# Patient Record
Sex: FEMALE | Race: WHITE | NOT HISPANIC OR LATINO | Employment: OTHER | URBAN - METROPOLITAN AREA
[De-identification: names, ages, dates, MRNs, and addresses within clinical notes are randomized per-mention and may not be internally consistent; named-entity substitution may affect disease eponyms.]

---

## 2023-02-28 LAB
APPEARANCE, URINE: ABNORMAL
BACTERIA, URINE: ABNORMAL /HPF
BILIRUBIN, URINE: NEGATIVE
BLOOD, URINE: NEGATIVE
COLOR, URINE: YELLOW
GLUCOSE, URINE: NEGATIVE MG/DL
KETONES, URINE: NEGATIVE MG/DL
LEUKOCYTE ESTERASE, URINE: ABNORMAL
MUCUS, URINE: ABNORMAL /LPF
NITRITE, URINE: NEGATIVE
PH, URINE: 6 (ref 5–8)
PROTEIN, URINE: NEGATIVE MG/DL
RBC, URINE: 3 /HPF (ref 0–5)
SPECIFIC GRAVITY, URINE: 1.02 (ref 1–1.03)
SQUAMOUS EPITHELIAL CELLS, URINE: 10 /HPF
TRANSITIONAL EPITHELIAL CELLS, URINE: 1 /HPF
UROBILINOGEN, URINE: 2 MG/DL (ref 0–1.9)
WBC CLUMPS, URINE: ABNORMAL /HPF
WBC, URINE: 43 /HPF (ref 0–5)

## 2023-04-25 ENCOUNTER — APPOINTMENT (OUTPATIENT)
Dept: LAB | Facility: LAB | Age: 78
End: 2023-04-25
Payer: MEDICARE

## 2023-04-28 LAB
AMORPHOUS CRYSTALS, URINE: ABNORMAL /HPF
APPEARANCE, URINE: CLEAR
BACTERIA, URINE: ABNORMAL /HPF
BILIRUBIN, URINE: NEGATIVE
BLOOD, URINE: NEGATIVE
COLOR, URINE: YELLOW
GLUCOSE, URINE: NEGATIVE MG/DL
KETONES, URINE: NEGATIVE MG/DL
LEUKOCYTE ESTERASE, URINE: ABNORMAL
NITRITE, URINE: NEGATIVE
PH, URINE: 5 (ref 5–8)
PROTEIN, URINE: NEGATIVE MG/DL
RBC, URINE: ABNORMAL /HPF (ref 0–5)
SPECIFIC GRAVITY, URINE: 1.01 (ref 1–1.03)
SQUAMOUS EPITHELIAL CELLS, URINE: 1 /HPF
UROBILINOGEN, URINE: <2 MG/DL (ref 0–1.9)
WBC, URINE: 6 /HPF (ref 0–5)

## 2023-04-30 LAB — URINE CULTURE: ABNORMAL

## 2023-05-16 LAB
APPEARANCE, URINE: ABNORMAL
BACTERIA, URINE: ABNORMAL /HPF
BILIRUBIN, URINE: NEGATIVE
BLOOD, URINE: NEGATIVE
COLOR, URINE: YELLOW
GLUCOSE, URINE: NEGATIVE MG/DL
KETONES, URINE: NEGATIVE MG/DL
LEUKOCYTE ESTERASE, URINE: ABNORMAL
NITRITE, URINE: NEGATIVE
PH, URINE: 5 (ref 5–8)
PROTEIN, URINE: NEGATIVE MG/DL
RBC, URINE: ABNORMAL /HPF (ref 0–5)
SPECIFIC GRAVITY, URINE: 1.02 (ref 1–1.03)
SQUAMOUS EPITHELIAL CELLS, URINE: 5 /HPF
UROBILINOGEN, URINE: <2 MG/DL (ref 0–1.9)
WBC CLUMPS, URINE: ABNORMAL /HPF
WBC, URINE: >182 /HPF (ref 0–5)

## 2023-05-19 LAB — URINE CULTURE: ABNORMAL

## 2023-05-26 LAB
AMPHETAMINE (PRESENCE) IN URINE BY SCREEN METHOD: NORMAL
BARBITURATES PRESENCE IN URINE BY SCREEN METHOD: NORMAL
BENZODIAZEPINE (PRESENCE) IN URINE BY SCREEN METHOD: NORMAL
CANNABINOIDS IN URINE BY SCREEN METHOD: NORMAL
COCAINE (PRESENCE) IN URINE BY SCREEN METHOD: NORMAL
DRUG SCREEN COMMENT URINE: NORMAL
FENTANYL URINE: NORMAL
METHADONE (PRESENCE) IN URINE BY SCREEN METHOD: NORMAL
OPIATES (PRESENCE) IN URINE BY SCREEN METHOD: NORMAL
OXYCODONE (PRESENCE) IN URINE BY SCREEN METHOD: NORMAL
PHENCYCLIDINE (PRESENCE) IN URINE BY SCREEN METHOD: NORMAL

## 2023-06-27 ENCOUNTER — HOSPITAL ENCOUNTER (OUTPATIENT)
Dept: DATA CONVERSION | Facility: HOSPITAL | Age: 78
End: 2023-06-27
Attending: ANESTHESIOLOGY | Admitting: ANESTHESIOLOGY
Payer: MEDICARE

## 2023-06-27 DIAGNOSIS — Z79.82 LONG TERM (CURRENT) USE OF ASPIRIN: ICD-10-CM

## 2023-06-27 DIAGNOSIS — Z79.85 LONG-TERM (CURRENT) USE OF INJECTABLE NON-INSULIN ANTIDIABETIC DRUGS: ICD-10-CM

## 2023-06-27 DIAGNOSIS — E11.9 TYPE 2 DIABETES MELLITUS WITHOUT COMPLICATIONS (MULTI): ICD-10-CM

## 2023-06-27 DIAGNOSIS — M25.511 PAIN IN RIGHT SHOULDER: ICD-10-CM

## 2023-06-27 DIAGNOSIS — I10 ESSENTIAL (PRIMARY) HYPERTENSION: ICD-10-CM

## 2023-06-27 DIAGNOSIS — E07.9 DISORDER OF THYROID, UNSPECIFIED: ICD-10-CM

## 2023-06-27 DIAGNOSIS — M19.011 PRIMARY OSTEOARTHRITIS, RIGHT SHOULDER: ICD-10-CM

## 2023-06-27 DIAGNOSIS — E78.00 PURE HYPERCHOLESTEROLEMIA, UNSPECIFIED: ICD-10-CM

## 2023-06-27 LAB — POCT GLUCOSE: 113 MG/DL (ref 74–99)

## 2023-08-11 LAB
APPEARANCE, URINE: ABNORMAL
BACTERIA, URINE: ABNORMAL /HPF
BILIRUBIN, URINE: NEGATIVE
BLOOD, URINE: NEGATIVE
COLOR, URINE: ABNORMAL
GLUCOSE, URINE: NEGATIVE MG/DL
KETONES, URINE: ABNORMAL MG/DL
LEUKOCYTE ESTERASE, URINE: ABNORMAL
MUCUS, URINE: ABNORMAL /LPF
NITRITE, URINE: POSITIVE
PH, URINE: 5 (ref 5–8)
PROTEIN, URINE: NEGATIVE MG/DL
RBC, URINE: 1 /HPF (ref 0–5)
SPECIFIC GRAVITY, URINE: 1.03 (ref 1–1.03)
SQUAMOUS EPITHELIAL CELLS, URINE: 2 /HPF
UROBILINOGEN, URINE: <2 MG/DL (ref 0–1.9)
WBC CLUMPS, URINE: ABNORMAL /HPF
WBC, URINE: 84 /HPF (ref 0–5)

## 2023-08-14 LAB — URINE CULTURE: ABNORMAL

## 2023-08-29 ENCOUNTER — HOSPITAL ENCOUNTER (OUTPATIENT)
Dept: DATA CONVERSION | Facility: HOSPITAL | Age: 78
End: 2023-08-29
Attending: ANESTHESIOLOGY | Admitting: ANESTHESIOLOGY
Payer: MEDICARE

## 2023-08-29 DIAGNOSIS — M19.011 PRIMARY OSTEOARTHRITIS, RIGHT SHOULDER: ICD-10-CM

## 2023-08-29 DIAGNOSIS — M25.511 PAIN IN RIGHT SHOULDER: ICD-10-CM

## 2023-09-29 VITALS — BODY MASS INDEX: 41.87 KG/M2 | WEIGHT: 227.51 LBS | HEIGHT: 62 IN

## 2023-10-01 NOTE — OP NOTE
Post Operative Note:     PreOp Diagnosis: Osteoarthritis right shoulder   Post-Procedure Diagnosis: Same   Procedure: 1.  Right intra-articular shoulder injection  with fluoroscopy  2.   3.   4.   5.   Surgeon: Dr. Cullen   Resident/Fellow/Other Assistant: None   Anesthesia: Local   Estimated Blood Loss (mL): none   Specimen: no   Findings: None     Operative Report Dictated:  Dictation: not applicable - note contains Operative  Report   Operative Report:    72-year-old  female here today for right intra-articular shoulder injection under fluoroscopy.  Risk and benefits of today's procedure as well as COVID-19  was discussed with her and a signed consent was obtained prior to her entering the operating room.  Surgical marker to reginald the surgical site preoperatively.  Preop diagnosis:  Severe degenerative joint disease right shoulder with arthralgia  Postop diagnosis: Same  Anesthesia: Local  Procedure: Right shoulder injection with fluoroscopic guidance   Patient is a 73year old  female who is here today for a  right shoulder injection. Patient has severe degenerative joint disease with chronic pain and has signed a consent prior to this procedure today. The patient was taken back to the operating  room and placed in the supine position.  A  sterile prep and draping of the shoulder was done with Betadine and chloraprep × 3.  Using fluoroscopic guidance and an 8 inch Sterile ring  forcep as a pointer, I identified the lateral humeral head and  glenohumeral joint and placed a total of 4 cc of 1% Xylocaine plain via 25-gauge 1-1/2 inch sterile needle for local anesthesia.  I then placed a sterile (1 1/2 inch) one and a half inch 22-gauge spinal needle at an oblique angle into the lateral aspect  of the humeral head.  Negative aspiration was noted of blood or fluid. 3 cc of Omnipaque 240 milligrams was injected to outline the glenohumeral joint and the humeral head.  This was followed by  injection of 40 mg Kenalog +4 cc of 2 % MPF xylocaine plain.  The needle was then removed and a sterile bandage applied over the puncture site along with Neosporin ointment. The patient was taken to recovery room in awake stable condition with no neurologic deficit.  The patient was scheduled for a follow-up visit  in 4-6 weeks.      Electronic Signatures:  Cody Cullen)  (Signed 29-Aug-2023 10:31)   Authored: Post Operative Note, Note Completion      Last Updated: 29-Aug-2023 10:31 by Cody Cullen ()

## 2023-10-02 NOTE — OP NOTE
Post Operative Note:     PreOp Diagnosis: Osteoarthritis right shoulder   Post-Procedure Diagnosis: Same   Procedure: 1.   2.  Right intra-articular shoulder injection with fluoroscopy  3.   4.   5.   Surgeon: Dr. Cullen   Resident/Fellow/Other Assistant: None   Anesthesia: Local   Estimated Blood Loss (mL): none   Specimen: no   Findings: None     Operative Report Dictated:  Dictation: not applicable - note contains Operative  Report   Operative Report:    78-year-old  female here today for right intra-articular shoulder injection on fluoroscopy.  Risk and benefits of today's procedure as well as COVID-19  was discussed with her and a signed consent was obtained prior to her entering the OR.  Preop diagnosis:  Severe degenerative joint disease right shoulder with arthralgia  Postop diagnosis: Same  Anesthesia: Local  Procedure: Right shoulder injection with fluoroscopic guidance   Patient is a 78year old  female who is here today for a right shoulder injection. Patient has severe degenerative joint disease with chronic pain and has signed a consent prior to this procedure today. The patient was taken back to the operating  room and placed in the supine position.  A  sterile prep and draping of the shoulder was done with Betadine and chloraprep × 3.  Using fluoroscopic guidance and an 8 inch Sterile ring  forcep as a pointer, I identified the lateral humeral head and  glenohumeral joint and placed a total of 4 cc of 1% Xylocaine plain via 25-gauge 1-1/2 inch sterile needle for local anesthesia.  I then placed a sterile (1 1/2 inch) one and a half inch 22-gauge spinal needle at an oblique angle into the lateral aspect  of the humeral head.  Negative aspiration was noted of blood or fluid. 3 cc of Omnipaque 240 milligrams was injected to outline the glenohumeral joint and the humeral head.  This was followed by injection of 40 mg Kenalog +4 cc of 2 % MPF xylocaine plain.  The needle was then  removed and a sterile bandage applied over the puncture site along with Neosporin ointment. The patient was taken to recovery room in awake stable condition with no neurologic deficit.  The patient was scheduled for a follow-up visit  in 4-6 weeks.      Electronic Signatures:  Cody Cullen)  (Signed 27-Jun-2023 10:35)   Authored: Post Operative Note, Note Completion      Last Updated: 27-Jun-2023 10:35 by Cody Cullen ()

## 2023-10-20 ENCOUNTER — TELEPHONE (OUTPATIENT)
Dept: PRIMARY CARE | Facility: CLINIC | Age: 78
End: 2023-10-20
Payer: MEDICARE

## 2023-10-20 PROBLEM — E03.9 ACQUIRED HYPOTHYROIDISM: Status: ACTIVE | Noted: 2023-10-20

## 2023-10-20 PROBLEM — M54.12 CHRONIC CERVICAL RADICULOPATHY: Status: ACTIVE | Noted: 2023-10-20

## 2023-10-20 PROBLEM — M54.2 CERVICALGIA: Status: ACTIVE | Noted: 2023-10-20

## 2023-10-20 PROBLEM — L40.50 PSORIATIC ARTHRITIS (MULTI): Status: ACTIVE | Noted: 2023-10-20

## 2023-10-20 PROBLEM — I10 ESSENTIAL HYPERTENSION: Status: ACTIVE | Noted: 2023-10-20

## 2023-10-20 PROBLEM — E55.9 VITAMIN D DEFICIENCY: Status: ACTIVE | Noted: 2023-10-20

## 2023-10-20 PROBLEM — E11.319: Status: ACTIVE | Noted: 2023-10-20

## 2023-10-20 PROBLEM — L40.9 PSORIASIS: Status: ACTIVE | Noted: 2023-10-20

## 2023-10-20 PROBLEM — G62.9 POLYNEUROPATHY: Status: ACTIVE | Noted: 2023-10-20

## 2023-10-20 PROBLEM — K21.9 GASTROESOPHAGEAL REFLUX DISEASE: Status: ACTIVE | Noted: 2023-10-20

## 2023-10-20 PROBLEM — D50.8 IRON DEFICIENCY ANEMIA SECONDARY TO INADEQUATE DIETARY IRON INTAKE: Status: ACTIVE | Noted: 2023-10-20

## 2023-10-20 PROBLEM — E78.1 HIGH TRIGLYCERIDES: Status: ACTIVE | Noted: 2023-10-20

## 2023-10-20 PROBLEM — E11.9 DIABETES (MULTI): Status: ACTIVE | Noted: 2023-10-20

## 2023-10-20 RX ORDER — HYDROCORTISONE 25 MG/G
CREAM TOPICAL
COMMUNITY
Start: 2022-06-28 | End: 2023-10-22 | Stop reason: WASHOUT

## 2023-10-20 RX ORDER — DIMETHICONE 13 MG/ML
LOTION TOPICAL
COMMUNITY

## 2023-10-20 RX ORDER — METAPROTERENOL SULFATE 10 MG
1 TABLET ORAL 2 TIMES DAILY
COMMUNITY
Start: 2023-07-15 | End: 2023-10-22 | Stop reason: WASHOUT

## 2023-10-20 RX ORDER — DOCUSATE SODIUM 100 MG/1
1 CAPSULE, LIQUID FILLED ORAL DAILY PRN
COMMUNITY

## 2023-10-20 RX ORDER — INDAPAMIDE 2.5 MG/1
1 TABLET ORAL EVERY MORNING
COMMUNITY
Start: 2002-05-17 | End: 2024-04-15

## 2023-10-20 RX ORDER — TIZANIDINE 2 MG/1
1 TABLET ORAL DAILY PRN
COMMUNITY
End: 2023-10-22 | Stop reason: WASHOUT

## 2023-10-20 RX ORDER — LANOLIN ALCOHOL/MO/W.PET/CERES
1 CREAM (GRAM) TOPICAL
COMMUNITY

## 2023-10-20 RX ORDER — DICLOFENAC SODIUM 10 MG/G
GEL TOPICAL
COMMUNITY
Start: 2023-07-09

## 2023-10-20 RX ORDER — TIZANIDINE 4 MG/1
1 TABLET ORAL DAILY PRN
COMMUNITY

## 2023-10-20 RX ORDER — DIPHENOXYLATE HYDROCHLORIDE AND ATROPINE SULFATE 2.5; .025 MG/1; MG/1
1 TABLET ORAL 4 TIMES DAILY PRN
COMMUNITY
Start: 2023-01-11

## 2023-10-20 RX ORDER — GABAPENTIN 300 MG/1
300 CAPSULE ORAL 3 TIMES DAILY
COMMUNITY
End: 2023-10-27 | Stop reason: SDUPTHER

## 2023-10-22 RX ORDER — LEVOTHYROXINE SODIUM 50 UG/1
50 TABLET ORAL
COMMUNITY
Start: 2023-10-14 | End: 2024-04-15

## 2023-10-22 RX ORDER — CLOTRIMAZOLE 1 %
CREAM (GRAM) TOPICAL
COMMUNITY
Start: 2023-08-30

## 2023-10-22 RX ORDER — OMEGA-3/DHA/EPA/FISH OIL 60 MG-90MG
500 CAPSULE ORAL 2 TIMES DAILY
COMMUNITY
Start: 2023-08-31 | End: 2024-01-22 | Stop reason: SDUPTHER

## 2023-10-22 RX ORDER — ESCITALOPRAM OXALATE 10 MG/1
10 TABLET ORAL DAILY
COMMUNITY
Start: 2023-10-14 | End: 2024-04-15

## 2023-10-22 RX ORDER — SIMVASTATIN 40 MG/1
40 TABLET, FILM COATED ORAL NIGHTLY
COMMUNITY
Start: 2023-10-14 | End: 2024-04-15

## 2023-10-23 ENCOUNTER — LAB (OUTPATIENT)
Dept: LAB | Facility: LAB | Age: 78
End: 2023-10-23
Payer: MEDICARE

## 2023-10-23 ENCOUNTER — OFFICE VISIT (OUTPATIENT)
Dept: PRIMARY CARE | Facility: CLINIC | Age: 78
End: 2023-10-23
Payer: MEDICARE

## 2023-10-23 VITALS
TEMPERATURE: 97.1 F | HEIGHT: 62 IN | RESPIRATION RATE: 16 BRPM | SYSTOLIC BLOOD PRESSURE: 128 MMHG | OXYGEN SATURATION: 96 % | DIASTOLIC BLOOD PRESSURE: 78 MMHG | HEART RATE: 74 BPM | WEIGHT: 221.78 LBS | BODY MASS INDEX: 40.81 KG/M2

## 2023-10-23 DIAGNOSIS — K21.9 GASTROESOPHAGEAL REFLUX DISEASE WITHOUT ESOPHAGITIS: ICD-10-CM

## 2023-10-23 DIAGNOSIS — E55.9 VITAMIN D DEFICIENCY: ICD-10-CM

## 2023-10-23 DIAGNOSIS — E53.8 VITAMIN B12 DEFICIENCY: ICD-10-CM

## 2023-10-23 DIAGNOSIS — E78.1 HIGH TRIGLYCERIDES: ICD-10-CM

## 2023-10-23 DIAGNOSIS — I10 ESSENTIAL HYPERTENSION: Primary | ICD-10-CM

## 2023-10-23 DIAGNOSIS — F33.0 MILD EPISODE OF RECURRENT MAJOR DEPRESSIVE DISORDER (CMS-HCC): ICD-10-CM

## 2023-10-23 DIAGNOSIS — E11.319 DIABETIC RETINOPATHY ASSOCIATED WITH CONTROLLED TYPE 2 DIABETES MELLITUS (MULTI): ICD-10-CM

## 2023-10-23 DIAGNOSIS — G62.9 POLYNEUROPATHY: ICD-10-CM

## 2023-10-23 DIAGNOSIS — E03.9 ACQUIRED HYPOTHYROIDISM: ICD-10-CM

## 2023-10-23 LAB
25(OH)D3 SERPL-MCNC: 40 NG/ML (ref 30–100)
ALBUMIN SERPL BCP-MCNC: 4 G/DL (ref 3.4–5)
ALP SERPL-CCNC: 51 U/L (ref 33–136)
ALT SERPL W P-5'-P-CCNC: 15 U/L (ref 7–45)
ANION GAP SERPL CALC-SCNC: 18 MMOL/L (ref 10–20)
AST SERPL W P-5'-P-CCNC: 17 U/L (ref 9–39)
BILIRUB SERPL-MCNC: 0.6 MG/DL (ref 0–1.2)
BUN SERPL-MCNC: 18 MG/DL (ref 6–23)
CALCIUM SERPL-MCNC: 9.1 MG/DL (ref 8.6–10.3)
CHLORIDE SERPL-SCNC: 99 MMOL/L (ref 98–107)
CHOLEST SERPL-MCNC: 151 MG/DL (ref 0–199)
CHOLESTEROL/HDL RATIO: 3.7
CO2 SERPL-SCNC: 30 MMOL/L (ref 21–32)
CREAT SERPL-MCNC: 0.9 MG/DL (ref 0.5–1.05)
ERYTHROCYTE [DISTWIDTH] IN BLOOD BY AUTOMATED COUNT: 12.7 % (ref 11.5–14.5)
EST. AVERAGE GLUCOSE BLD GHB EST-MCNC: 120 MG/DL
GFR SERPL CREATININE-BSD FRML MDRD: 65 ML/MIN/1.73M*2
GLUCOSE SERPL-MCNC: 112 MG/DL (ref 74–99)
HBA1C MFR BLD: 5.8 %
HCT VFR BLD AUTO: 43.5 % (ref 36–46)
HDLC SERPL-MCNC: 40.3 MG/DL
HGB BLD-MCNC: 14 G/DL (ref 12–16)
LDLC SERPL CALC-MCNC: 66 MG/DL
MCH RBC QN AUTO: 31.2 PG (ref 26–34)
MCHC RBC AUTO-ENTMCNC: 32.2 G/DL (ref 32–36)
MCV RBC AUTO: 97 FL (ref 80–100)
NON HDL CHOLESTEROL: 111 MG/DL (ref 0–149)
NRBC BLD-RTO: 0 /100 WBCS (ref 0–0)
PLATELET # BLD AUTO: 261 X10*3/UL (ref 150–450)
PMV BLD AUTO: 10 FL (ref 7.5–11.5)
POTASSIUM SERPL-SCNC: 3.8 MMOL/L (ref 3.5–5.3)
PROT SERPL-MCNC: 6.1 G/DL (ref 6.4–8.2)
RBC # BLD AUTO: 4.49 X10*6/UL (ref 4–5.2)
SODIUM SERPL-SCNC: 143 MMOL/L (ref 136–145)
TRIGL SERPL-MCNC: 225 MG/DL (ref 0–149)
TSH SERPL-ACNC: 2.16 MIU/L (ref 0.44–3.98)
VIT B12 SERPL-MCNC: 1493 PG/ML (ref 211–911)
VLDL: 45 MG/DL (ref 0–40)
WBC # BLD AUTO: 6.9 X10*3/UL (ref 4.4–11.3)

## 2023-10-23 PROCEDURE — 3078F DIAST BP <80 MM HG: CPT | Performed by: NURSE PRACTITIONER

## 2023-10-23 PROCEDURE — 82607 VITAMIN B-12: CPT

## 2023-10-23 PROCEDURE — 99349 HOME/RES VST EST MOD MDM 40: CPT | Performed by: NURSE PRACTITIONER

## 2023-10-23 PROCEDURE — 36415 COLL VENOUS BLD VENIPUNCTURE: CPT | Performed by: NURSE PRACTITIONER

## 2023-10-23 PROCEDURE — 1159F MED LIST DOCD IN RCRD: CPT | Performed by: NURSE PRACTITIONER

## 2023-10-23 PROCEDURE — 3074F SYST BP LT 130 MM HG: CPT | Performed by: NURSE PRACTITIONER

## 2023-10-23 PROCEDURE — 1160F RVW MEDS BY RX/DR IN RCRD: CPT | Performed by: NURSE PRACTITIONER

## 2023-10-23 PROCEDURE — 1126F AMNT PAIN NOTED NONE PRSNT: CPT | Performed by: NURSE PRACTITIONER

## 2023-10-23 PROCEDURE — 1036F TOBACCO NON-USER: CPT | Performed by: NURSE PRACTITIONER

## 2023-10-23 PROCEDURE — 36415 COLL VENOUS BLD VENIPUNCTURE: CPT

## 2023-10-23 PROCEDURE — 82306 VITAMIN D 25 HYDROXY: CPT

## 2023-10-23 PROCEDURE — 83036 HEMOGLOBIN GLYCOSYLATED A1C: CPT

## 2023-10-23 RX ORDER — TRIAMCINOLONE ACETONIDE 1 MG/G
1 OINTMENT TOPICAL 2 TIMES DAILY
COMMUNITY

## 2023-10-23 RX ORDER — BISMUTH SUBSALICYLATE 262 MG
1 TABLET,CHEWABLE ORAL DAILY
COMMUNITY

## 2023-10-23 RX ORDER — CLOBETASOL PROPIONATE 0.5 MG/G
1 OINTMENT TOPICAL 2 TIMES DAILY
COMMUNITY

## 2023-10-23 RX ORDER — PANTOPRAZOLE SODIUM 40 MG/1
40 TABLET, DELAYED RELEASE ORAL
COMMUNITY

## 2023-10-23 RX ORDER — BUTYROSPERMUM PARKII(SHEA BUTTER), SIMMONDSIA CHINENSIS (JOJOBA) SEED OIL, ALOE BARBADENSIS LEAF EXTRACT .01; 1; 3.5 G/100G; G/100G; G/100G
1 LIQUID TOPICAL DAILY
COMMUNITY

## 2023-10-23 RX ORDER — METFORMIN HYDROCHLORIDE 500 MG/1
1 TABLET ORAL EVERY OTHER DAY
COMMUNITY
End: 2024-04-22 | Stop reason: WASHOUT

## 2023-10-23 RX ORDER — ASPIRIN 81 MG/1
81 TABLET ORAL DAILY
COMMUNITY

## 2023-10-23 RX ORDER — METOPROLOL TARTRATE 25 MG/1
0.5 TABLET, FILM COATED ORAL 2 TIMES DAILY
COMMUNITY

## 2023-10-23 ASSESSMENT — ENCOUNTER SYMPTOMS
PALPITATIONS: 0
ARTHRALGIAS: 1
VOMITING: 0
CONSTIPATION: 1
NAUSEA: 0
COUGH: 1
TROUBLE SWALLOWING: 0
FEVER: 0
OCCASIONAL FEELINGS OF UNSTEADINESS: 1
LIGHT-HEADEDNESS: 0
WHEEZING: 0
CHILLS: 0
LOSS OF SENSATION IN FEET: 0
DIFFICULTY URINATING: 0
DEPRESSION: 1
APPETITE CHANGE: 0
UNEXPECTED WEIGHT CHANGE: 0
ABDOMINAL PAIN: 0
SHORTNESS OF BREATH: 1
DIZZINESS: 0

## 2023-10-23 ASSESSMENT — PAIN SCALES - GENERAL: PAINLEVEL: 0-NO PAIN

## 2023-10-23 NOTE — PROGRESS NOTES
Subjective   Patient ID: Sara Zavala is a 78 y.o. female who presents for Follow-up (Chronic medical conditions ).    Visit for 77 y/o female seen today in private home, alone for routine follow up of chronic conditions. Patient is sitting up in recliner this morning watching TV. She is alert, oriented, able to answer all questions regarding his health. Patient lives in a single story home with her brother. She has a vehicle and is able to drive very short distances but has not driven recently due to declining mobility. She is ambulatory short distances. She has a cane but recently started using a walker. Her brother provides most transportation and takes her to necessary medical appointments. She did go and get a COVID and Influenza vaccine on 10/3/23. She tolerated both vaccines well. Denies recent fall or injury. Patients brother Larry does all meal preparation in the home. He does all of the grocery shopping and picks up all medications from local [employer] [employer]. She is going to be switching to [facility] pharmacy in January. Patient denies appetite changes, abdominal pain, nausea, vomiting. She does have some occasional constipation but denies any issue today. Denies bladder concerns. Denies difficulty sleeping. No recent hospitalizations.     Chronic pain- mostly in lower back, right shoulder and bilateral knees. She is active with Dr. Minaya, pain management. She is scheduled for a follow up on 10/27. Patient takes Gabapentin routinely. She is taking Tylenol and Voltaren gel as needed with improvement.     HTN- patient has a home BP cuff and monitors her blood pressure routinely. Cardiologist is Dr. Young. She has follow up scheduled 11/2. Denies headaches,chest pain, palpitations.     DM- patient monitors glucose levels daily. She does admit that she will occasionally skip a day here and there. FBS typically range from . Patient denies dizziness, lightheadedness. No recent episodes of hypoglycemia. She  follows with podiatry Dr. Zee and has follow up 11/1/23. She follows with opththamologist Dr Watts annually. Will be due June 2024 for follow up. She reports that she recently started taking Metformin again, about 2 weeks ago. She is taking 1 tablet every other day. She has had a few loose Bms but believes it is diet related and not because of the Metformin.     Home Visit:          Medically necessary due to: Illness or condition that results in activity lmitation or restriction that impacts the ability to leave home such as:, unsteady gait/poor balance         Current Outpatient Medications:     aspirin 81 mg EC tablet, Take 1 tablet (81 mg) by mouth once daily., Disp: , Rfl:     calcium citrate/vitamin D3 (CALCIUM CITRATE + D ORAL), as directed Orally, Disp: , Rfl:     clobetasol (Temovate) 0.05 % ointment, Apply 1 Application topically 2 times a day., Disp: , Rfl:     clotrimazole (Lotrimin) 1 % cream, APPLY 1 APPLICATION EXTERNALLY TWICE A DAY, Disp: , Rfl:     cranberry extract 425 mg capsule, as directed Orally, Disp: , Rfl:     cyanocobalamin (Vitamin B-12) 1,000 mcg tablet, Take 1 tablet (1,000 mcg) by mouth., Disp: , Rfl:     diclofenac sodium (Voltaren) 1 % gel gel, APPLY SPARINGLY TO THE AFFECTED AREA 2 TO 3 TIMES A DAY AS NEEDED FOR PAIN., Disp: , Rfl:     diphenoxylate-atropine (Lomotil) 2.5-0.025 mg tablet, Take 1 tablet by mouth 4 times a day as needed., Disp: , Rfl:     docusate sodium (Colace) 100 mg capsule, Take 1 capsule (100 mg) by mouth once daily as needed., Disp: , Rfl:     dulaglutide (Trulicity) 0.75 mg/0.5 mL pen injector, INJECT 1 PEN-INJECTOR UNDER THE SKIN ONCE EVERY WEEK, Disp: 6 mL, Rfl: 1    escitalopram (Lexapro) 10 mg tablet, Take 1 tablet (10 mg) by mouth once daily., Disp: , Rfl:     Fish OiL 60- mg capsule, Take 1 capsule (500 mg) by mouth 2 times a day., Disp: , Rfl:     gabapentin (Neurontin) 300 mg capsule, Take 1 capsule (300 mg) by mouth 3 times a day., Disp: , Rfl:      indapamide (Lozol) 2.5 mg tablet, Take 1 tablet (2.5 mg) by mouth once daily in the morning., Disp: , Rfl:     levothyroxine (Synthroid, Levoxyl) 50 mcg tablet, Take 1 tablet (50 mcg) by mouth once daily in the morning. Take before meals. Take on an empty stomach., Disp: , Rfl:     metFORMIN (Glucophage) 500 mg tablet, Take 1 tablet (500 mg) by mouth every other day., Disp: , Rfl:     metoprolol tartrate (Lopressor) 25 mg tablet, Take 0.5 tablets (12.5 mg) by mouth 2 times a day., Disp: , Rfl:     multivitamin tablet, Take 1 tablet by mouth once daily., Disp: , Rfl:     pantoprazole (ProtoNix) 40 mg EC tablet, Take 1 tablet (40 mg) by mouth once daily in the morning. Take before meals. Do not crush, chew, or split., Disp: , Rfl:     saccharomyces boulardii (Florastor) 250 mg capsule, Take 1 capsule (250 mg) by mouth once daily., Disp: , Rfl:     simvastatin (Zocor) 40 mg tablet, Take 1 tablet (40 mg) by mouth once daily at bedtime., Disp: , Rfl:     tiZANidine (Zanaflex) 4 mg tablet, Take 1 tablet (4 mg) by mouth once daily as needed., Disp: , Rfl:     triamcinolone (Kenalog) 0.1 % ointment, Apply 1 Application topically 2 times a day., Disp: , Rfl:      Review of Systems   Constitutional:  Negative for appetite change, chills, fever and unexpected weight change.   HENT:  Negative for trouble swallowing.    Respiratory:  Positive for cough (occasional) and shortness of breath (on exertion). Negative for wheezing.    Cardiovascular:  Negative for chest pain, palpitations and leg swelling.   Gastrointestinal:  Positive for constipation (occasional, improved). Negative for abdominal pain, nausea and vomiting.   Endocrine:        Positive for diabetes, thyroid disease    Genitourinary:  Negative for difficulty urinating.   Musculoskeletal:  Positive for arthralgias (lower back, right shoulder, bilateral knees) and gait problem (uses walker).   Neurological:  Negative for dizziness and light-headedness.  "  Psychiatric/Behavioral:          Positive for depression     Objective   /78 (BP Location: Left arm, Patient Position: Sitting, BP Cuff Size: Adult)   Pulse 74   Temp 36.2 °C (97.1 °F) (Temporal)   Resp 16   Ht 1.575 m (5' 2\")   Wt 101 kg (221 lb 12.5 oz)   SpO2 96%   BMI 40.56 kg/m²     Physical Exam  Constitutional:       General: She is not in acute distress.     Appearance: Normal appearance. She is obese.      Comments: Sitting in recliner with legs dependent    HENT:      Head: Normocephalic and atraumatic.      Nose: Nose normal.      Mouth/Throat:      Mouth: Mucous membranes are moist.      Pharynx: Oropharynx is clear.   Eyes:      Extraocular Movements: Extraocular movements intact.      Pupils: Pupils are equal, round, and reactive to light.      Comments: Wearing glasses    Cardiovascular:      Rate and Rhythm: Normal rate and regular rhythm.      Heart sounds: Normal heart sounds. No murmur heard.     No friction rub. No gallop.   Pulmonary:      Effort: Pulmonary effort is normal. No respiratory distress.      Breath sounds: Normal breath sounds. No wheezing, rhonchi or rales.   Abdominal:      General: Bowel sounds are normal. There is no distension.      Palpations: Abdomen is soft.      Comments: Hernia present   Musculoskeletal:         General: Deformity (modest deformity of bilateral knees) present.      Cervical back: Neck supple.      Right lower leg: No edema.      Left lower leg: No edema.   Skin:     General: Skin is warm and dry.   Neurological:      General: No focal deficit present.      Mental Status: She is alert and oriented to person, place, and time.      Motor: Weakness (generalized) present.      Gait: Gait abnormal.   Psychiatric:         Mood and Affect: Mood normal.         Behavior: Behavior normal.       Assessment/Plan   Diagnoses and all orders for this visit:  Essential hypertension  Comments:  chronic, vitals stable, continue Aspirin, metoprolol. Follow up " with cardiologist as scheduled  Orders:  -     CBC  -     Comprehensive metabolic panel  High triglycerides  Comments:  chronic, continue with Fish oil, will check lipid panel today  Orders:  -     Lipid panel  Acquired hypothyroidism  Comments:  chronic, stable, continue Levothyroxine  Orders:  -     Tsh With Reflex To Free T4 If Abnormal  Diabetic retinopathy associated with controlled type 2 diabetes mellitus (CMS/[facility])  Comments:  chronic, continue trulicity, metformin every other day. Monitor BG level daily  Orders:  -     Hemoglobin A1c  Gastroesophageal reflux disease without esophagitis  Comments:  chronic, stable, continue Protonix  Polyneuropathy  Comments:  chronic, stable, continue Gabapentin. Continue Tylenol and Voltaren gel as needed  Vitamin B12 deficiency  Comments:  chronic, stable, continue b12 supplement  Orders:  -     Vitamin B12  Vitamin D deficiency  Comments:  chronic, stable, continue vitamin d supplement  Orders:  -     Vitamin D 25-Hydroxy,Total (for eval of Vitamin D levels)  Mild episode of recurrent major depressive disorder (CMS/[facility])  Comments:  chronic, mood stable, continue lexapro       Routine labs obtained in home- CBC, CMP, Lipid panel, A1c, TSH, Vit D, Vit B12 level- pt tolerated well     LUDIVINA Vail-CNP

## 2023-10-23 NOTE — LETTER
October 26, 2023     Sara Zavala  [address] [place] [id number]      Dear Ms. Zavala:    Below are the results from your recent visit:    Resulted Orders   CBC   Result Value Ref Range    WBC 6.9 4.4 - 11.3 x10*3/uL    nRBC 0.0 0.0 - 0.0 /100 WBCs    RBC 4.49 4.00 - 5.20 x10*6/uL    Hemoglobin 14.0 12.0 - 16.0 g/dL    Hematocrit 43.5 36.0 - 46.0 %    MCV 97 80 - 100 fL    MCH 31.2 26.0 - 34.0 pg    MCHC 32.2 32.0 - 36.0 g/dL    RDW 12.7 11.5 - 14.5 %    Platelets 261 150 - 450 x10*3/uL    MPV 10.0 7.5 - 11.5 fL   Comprehensive metabolic panel   Result Value Ref Range    Glucose 112 (H) 74 - 99 mg/dL    Sodium 143 136 - 145 mmol/L    Potassium 3.8 3.5 - 5.3 mmol/L    Chloride 99 98 - 107 mmol/L    Bicarbonate 30 21 - 32 mmol/L    Anion Gap 18 10 - 20 mmol/L    Urea Nitrogen 18 6 - 23 mg/dL    Creatinine 0.90 0.50 - 1.05 mg/dL    eGFR 65 >60 mL/min/1.73m*2      Comment:      Calculations of estimated GFR are performed using the 2021 CKD-EPI Study Refit equation without the race variable for the IDMS-Traceable creatinine methods.  https://jasn.asnjournals.org/content/early/2021/09/22/ASN.6377146225    Calcium 9.1 8.6 - 10.3 mg/dL    Albumin 4.0 3.4 - 5.0 g/dL    Alkaline Phosphatase 51 33 - 136 U/L    Total Protein 6.1 (L) 6.4 - 8.2 g/dL    AST 17 9 - 39 U/L    Bilirubin, Total 0.6 0.0 - 1.2 mg/dL    ALT 15 7 - 45 U/L      Comment:      Patients treated with Sulfasalazine may generate falsely decreased results for ALT.   Hemoglobin A1c   Result Value Ref Range    Hemoglobin A1C 5.8 (H) see below %    Estimated Average Glucose 120 Not Established mg/dL    Narrative    Diagnosis of Diabetes-Adults  Non-Diabetic: < or = 5.6%  Increased risk for developing diabetes: 5.7-6.4%  Diagnostic of diabetes: > or = 6.5%    Monitoring of Diabetes  Age (y)....................... Therapeutic Goal (%)  Adults: >18.........................<7.0  Pediatrics: 13-18...................<7.5  Pediatrics:  7-12....................<8.0  Pediatrics: 0-6..................... 7.5-8.5    American Diabetes Association. Diabetes Care 33(S1), Jan 2010       Lipid panel   Result Value Ref Range    Cholesterol 151 0 - 199 mg/dL      Comment:            Age      Desirable   Borderline High   High     0-19 Y     0 - 169       170 - 199     >/= 200    20-24 Y     0 - 189       190 - 224     >/= 225         >24 Y     0 - 199       200 - 239     >/= 240   **All ranges are based on fasting samples. Specific   therapeutic targets will vary based on patient-specific   cardiac risk.    Pediatric guidelines reference:Pediatrics 2011, 128(S5).Adult guidelines reference: NCEP ATPIII Guidelines,RIYA 2001, 258:2486-97    Venipuncture immediately after or during the administration of Metamizole may lead to falsely low results. Testing should be performed immediately prior to Metamizole dosing.    HDL-Cholesterol 40.3 mg/dL      Comment:        Age       Very Low   Low     Normal    High    0-19 Y    < 35      < 40     40-45     ----  20-24 Y    ----     < 40      >45      ----        >24 Y      ----     < 40     40-60      >60      Cholesterol/HDL Ratio 3.7       Comment:        Ref Values  Desirable  < 3.4  High Risk  > 5.0    LDL Calculated 66 <=99 mg/dL      Comment:                                  Near   Borderline      AGE      Desirable  Optimal    High     High     Very High     0-19 Y     0 - 109     ---    110-129   >/= 130     ----    20-24 Y     0 - 119     ---    120-159   >/= 160     ----      >24 Y     0 -  99   100-129  130-159   160-189     >/=190      VLDL 45 (H) 0 - 40 mg/dL    Triglycerides 225 (H) 0 - 149 mg/dL      Comment:         Age         Desirable   Borderline High   High     Very High   0 D-90 D    19 - 174         ----         ----        ----  91 D- 9 Y     0 -  74        75 -  99     >/= 100      ----    10-19 Y     0 -  89        90 - 129     >/= 130      ----    20-24 Y     0 - 114       115 - 149     >/= 150       ----         >24 Y     0 - 149       150 - 199    200- 499    >/= 500    Venipuncture immediately after or during the administration of Metamizole may lead to falsely low results. Testing should be performed immediately prior to Metamizole dosing.    Non HDL Cholesterol 111 0 - 149 mg/dL      Comment:            Age       Desirable   Borderline High   High     Very High     0-19 Y     0 - 119       120 - 144     >/= 145    >/= 160    20-24 Y     0 - 149       150 - 189     >/= 190      ----         >24 Y    30 mg/dL above LDL Cholesterol goal     Tsh With Reflex To Free T4 If Abnormal   Result Value Ref Range    Thyroid Stimulating Hormone 2.16 0.44 - 3.98 mIU/L    Narrative    TSH testing is performed using different testing methodology at [facility]3] than at other [facility]. Direct result comparisons should only be made within the same method.     Vitamin B12   Result Value Ref Range    Vitamin B12 1,493 (H) 211 - 911 pg/mL   Vitamin D 25-Hydroxy,Total (for eval of Vitamin D levels)   Result Value Ref Range    Vitamin D, 25-Hydroxy, Total 40 30 - 100 ng/mL    Narrative    Deficiency:         < 20   ng/ml  Insufficiency:      20-29  ng/ml  Sufficiency:         ng/ml  This assay accurately quantifies the sum of Vitamin D3, 25-Hydroxy and Vitamin D2,25-Hydroxy.     The test results show that your current treatment is working. Please {Therapies; lab letter directions:82794}. We recommend that you repeat the above test(s) in {Numbers; 1-10:28551} {Time; units w/plural:11}.    If you have any questions or concerns, please don't hesitate to call.         Sincerely,        Joi Breen, APRN-CNP

## 2023-10-27 ENCOUNTER — OFFICE VISIT (OUTPATIENT)
Dept: PAIN MEDICINE | Facility: HOSPITAL | Age: 78
End: 2023-10-27
Payer: MEDICARE

## 2023-10-27 VITALS
WEIGHT: 221.78 LBS | BODY MASS INDEX: 41.87 KG/M2 | TEMPERATURE: 98 F | DIASTOLIC BLOOD PRESSURE: 83 MMHG | SYSTOLIC BLOOD PRESSURE: 134 MMHG | HEART RATE: 69 BPM | HEIGHT: 61 IN

## 2023-10-27 DIAGNOSIS — G89.29 CHRONIC RIGHT SHOULDER PAIN: ICD-10-CM

## 2023-10-27 DIAGNOSIS — M25.511 CHRONIC RIGHT SHOULDER PAIN: ICD-10-CM

## 2023-10-27 DIAGNOSIS — Z79.899 MEDICATION MANAGEMENT: ICD-10-CM

## 2023-10-27 DIAGNOSIS — M54.2 CERVICALGIA: ICD-10-CM

## 2023-10-27 DIAGNOSIS — M54.12 CERVICAL RADICULITIS: ICD-10-CM

## 2023-10-27 DIAGNOSIS — M47.812 FACET ARTHROPATHY, CERVICAL: Primary | ICD-10-CM

## 2023-10-27 LAB
AMPHETAMINES UR QL SCN: NORMAL
BARBITURATES UR QL SCN: NORMAL
BENZODIAZ UR QL SCN: NORMAL
BZE UR QL SCN: NORMAL
CANNABINOIDS UR QL SCN: NORMAL
FENTANYL+NORFENTANYL UR QL SCN: NORMAL
OPIATES UR QL SCN: NORMAL
OXYCODONE+OXYMORPHONE UR QL SCN: NORMAL
PCP UR QL SCN: NORMAL

## 2023-10-27 PROCEDURE — 80307 DRUG TEST PRSMV CHEM ANLYZR: CPT | Performed by: NURSE PRACTITIONER

## 2023-10-27 PROCEDURE — 1126F AMNT PAIN NOTED NONE PRSNT: CPT | Performed by: NURSE PRACTITIONER

## 2023-10-27 PROCEDURE — 1159F MED LIST DOCD IN RCRD: CPT | Performed by: NURSE PRACTITIONER

## 2023-10-27 PROCEDURE — 3079F DIAST BP 80-89 MM HG: CPT | Performed by: NURSE PRACTITIONER

## 2023-10-27 PROCEDURE — 1160F RVW MEDS BY RX/DR IN RCRD: CPT | Performed by: NURSE PRACTITIONER

## 2023-10-27 PROCEDURE — 3075F SYST BP GE 130 - 139MM HG: CPT | Performed by: NURSE PRACTITIONER

## 2023-10-27 PROCEDURE — 1036F TOBACCO NON-USER: CPT | Performed by: NURSE PRACTITIONER

## 2023-10-27 PROCEDURE — 99213 OFFICE O/P EST LOW 20 MIN: CPT | Mod: ZK | Performed by: NURSE PRACTITIONER

## 2023-10-27 PROCEDURE — 99213 OFFICE O/P EST LOW 20 MIN: CPT | Performed by: NURSE PRACTITIONER

## 2023-10-27 RX ORDER — GABAPENTIN 300 MG/1
CAPSULE ORAL
Qty: 90 CAPSULE | Refills: 2 | Status: SHIPPED | OUTPATIENT
Start: 2023-10-27 | End: 2024-01-15 | Stop reason: SDUPTHER

## 2023-10-27 ASSESSMENT — ENCOUNTER SYMPTOMS
BACK PAIN: 1
CARDIOVASCULAR NEGATIVE: 1
MYALGIAS: 1
ENDOCRINE NEGATIVE: 1
CONSTITUTIONAL NEGATIVE: 1
NECK PAIN: 1
RESPIRATORY NEGATIVE: 1
ALLERGIC/IMMUNOLOGIC NEGATIVE: 1
ARTHRALGIAS: 1
NEUROLOGICAL NEGATIVE: 1
PSYCHIATRIC NEGATIVE: 1
GASTROINTESTINAL NEGATIVE: 1
NECK STIFFNESS: 0
EYES NEGATIVE: 1
JOINT SWELLING: 0

## 2023-10-27 NOTE — PROGRESS NOTES
I have personally reviewed the OAR[place] report for Sara Zavala   . I have considered the risks of abuse, dependence, addiction and diversion.   Is the patient prescribed a combination of a benzodiazepine and opioid? No  Patient tolerating without AE. Benefit outweighs the risk. Discussed risks/benefits with patient. All questions answered. States understanding.     Date of the last Controlled Substance Agreement: 10/27/2023    Last urine drug screening date/ordered today: 10/27/23  Results of last screen: Results as expected.     OPIOID   What is the patient’s goal of therapy? Pain control.  Is this being achieved with current treatment? Yes  Attestation statement: I feel that it is clinically indicated to continue this current medication regimen after consideration of alternative therapies, and other non-opioid treatments.     Opioid Risk Screening:   THE OPIOID RISK TOOL (ORT)                               Female      Male  Alcohol                            [1]            [3] = 0  Illegal Drugs                           [2]            [3]  = 0  1. Family History of Substance Abuse  Prescription Drugs                           [4]           [4]  = 0  Alcohol                           [3]           [3]   =0  Illicit Drugs                           [4]           [4]   =0  2. Personal History of Substance Abuse  Prescription Drugs                          [5]           [5]   =0  3. Age (If between 16 to 45)                          [1]           [1]   =0  4. History of Preadolescent Sexual Abuse                         [3]            [0]   =0  ADD, OCD, Bipolar, Schizophrenia                         [2]           [2]   =0  5. Psychological Disease  Depression                        [1]           [1]   =1    TOTAL Score =  1     Last opioid risk screening date/ordered today: 10/27/2023  Patient's total score is 1, within range of Low Risk (<= 3).     Reference :  Low Score = 0 to 3  Moderate Score = 4 to 7  High  Score = =8       Pain Scale Screening:   Pain Assessment and Documentation Tool (PADT)   Date of Assessment: 10/27/2023  Analgesia:   Patient reports her pain level on average during the past week is 5on a 0 - 10 scale.   Patient reports that her pain level at its worst during the past week was 8 on a 0 -10 scale.   50% of pain has been relieved during the past week per patient   Patient states that the amount of pain relief she is now obtaining from her current pain reliever(s) is enough to make a real difference in her life.   Query to clinician: Is the patient's pain relief clinically significant? yes  Activities of Daily Living:   Physical functioning: unchanged  Family relationships: unchanged  Social relationships: unchanged  Mood: unchanged  Sleep patterns: unchanged  Overall functioning: unchanged  Adverse Events: No, Sara Zavala is not experiencing side effects from current pain reliever.  Patients overall severity of side effect:none  Is your overall impression that this patient is benefiting (e.g., benefits, such as pain relief, outweigh side effects) from opioid therapy? no  Specific Analgesic Plan: Continue present regimen.

## 2023-10-27 NOTE — PATIENT INSTRUCTIONS
I refilled your gabapentin.  Continue taking 300 mg 3 times a day.  You may take extra pill as needed.    I will see you for follow-up visit in 3 months, 1/9/2024 at 9:20 AM in [place].

## 2023-10-27 NOTE — PROGRESS NOTES
Subjective   Patient ID: Sara Zavala is a 78 y.o. female who presents for Pain (Post procedure follow up).    Sara Guevara is a pleasant 78-year-old  female who is here for postprocedure follow-up.  Patient presents in a wheelchair.  She ambulates with the use of cane.  She is accompanied by her brother.    Patient had right shoulder injection done on 8/29/2023.  The injection has improved her shoulder and arm pain.  It provided 50% relief.  It lasted for almost a month.  The injection has improved her pain and her ability to participate in his daily activities.  The injection has improved her pain and her ability to participate in her daily activities.    Patient continues to have right-sided shoulder pain that goes down to her arm.  She has pain to her neck on occasion.  She rates her pain as 4 out of 10.  She describes as burning, sharp, tender and throbbing kind of pain.  She denies arm weakness, weakness in  or dropping objects.  She denies increasing leg weakness or change in balance.  She denies bowel or bladder incontinence.  She denies recent falls, injuries, or ER visits.  There has been no change since she was last seen.    Patient continues to take gabapentin.  She takes 300 mg 3 times a day.  She reports that she takes extra 1 pill as needed depending on her pain level.  She denies side effects from her medication.  I discussed the plan of care.  I will see her for follow-up visit.  Questions were answered during this encounter.    -----------  8/29/2023: Right shoulder injection  -------        Past Medical History  She has a past medical history of Anemia, iron deficiency, Anxiety disorder, Atherosclerotic heart disease of native coronary artery without angina pectoris, Chronic back pain, Diabetic neuropathy (CMS/HCC), Diabetic retinopathy (CMS/HCC), Enlarged uterus, Hyperlipidemia, Hypothyroidism, Left ventricular ejection fraction greater than 40% (07/22/2019), Low back pain,  Morbid obesity (CMS/HCC), Other specified diabetes mellitus without complications (CMS/HCC), Personal history of other diseases of the circulatory system, Psoriasis, and Vitamin D deficiency.    Surgical History  Past Surgical History:   Procedure Laterality Date    CARDIAC CATHETERIZATION  05/03/2017    COLONOSCOPY  08/04/2010    COLONOSCOPY  07/31/2015    EPIDURAL BLOCK INJECTION  09/21/2010    OTHER SURGICAL HISTORY      Carpal tunnel surgery    OTHER SURGICAL HISTORY      Phacoemulsification of cataract and insertion of intraocular lens    OTHER SURGICAL HISTORY  2004    Bariatric surgery - gastric bypass    OTHER SURGICAL HISTORY  1961    Incision and drainage of pilonidal cyst, also 1962    OTHER SURGICAL HISTORY  10/26/2019    Pacemaker insertion - carmen Florez    OTHER SURGICAL HISTORY  2005    Cholecystectomy laparoscopic    SINUS SURGERY  10/2013    sack of bacteria from left sinus (removed)    TOE SURGERY  1985    4 toe nails removed    UMBILICAL HERNIA REPAIR  1986        Social History  She reports that she has never smoked. She has never used smokeless tobacco. She reports that she does not currently use alcohol. She reports that she does not use drugs.    Family History  Family History   Problem Relation Name Age of Onset    Diabetes Mother      Heart disease Mother      Hypertension Mother      Heart disease Father      Stroke Father      Hypertension Sister      Mental illness Brother      Diabetes Brother      Hypertension Brother          Allergies  Ace inhibitors and Sulfamethoxazole-trimethoprim      Current Outpatient Medications:     aspirin 81 mg EC tablet, Take 1 tablet (81 mg) by mouth once daily., Disp: , Rfl:     calcium citrate/vitamin D3 (CALCIUM CITRATE + D ORAL), as directed Orally, Disp: , Rfl:     clobetasol (Temovate) 0.05 % ointment, Apply 1 Application topically 2 times a day., Disp: , Rfl:     clotrimazole (Lotrimin) 1 % cream, APPLY 1 APPLICATION EXTERNALLY TWICE A  DAY, Disp: , Rfl:     cranberry extract 425 mg capsule, as directed Orally, Disp: , Rfl:     cyanocobalamin (Vitamin B-12) 1,000 mcg tablet, Take 1 tablet (1,000 mcg) by mouth., Disp: , Rfl:     diclofenac sodium (Voltaren) 1 % gel gel, APPLY SPARINGLY TO THE AFFECTED AREA 2 TO 3 TIMES A DAY AS NEEDED FOR PAIN., Disp: , Rfl:     diphenoxylate-atropine (Lomotil) 2.5-0.025 mg tablet, Take 1 tablet by mouth 4 times a day as needed., Disp: , Rfl:     docusate sodium (Colace) 100 mg capsule, Take 1 capsule (100 mg) by mouth once daily as needed., Disp: , Rfl:     dulaglutide (Trulicity) 0.75 mg/0.5 mL pen injector, INJECT 1 PEN-INJECTOR UNDER THE SKIN ONCE EVERY WEEK, Disp: 6 mL, Rfl: 1    escitalopram (Lexapro) 10 mg tablet, Take 1 tablet (10 mg) by mouth once daily., Disp: , Rfl:     Fish OiL 60- mg capsule, Take 1 capsule (500 mg) by mouth 2 times a day., Disp: , Rfl:     gabapentin (Neurontin) 300 mg capsule, Take 300 mg 3 times a day.  May take extra 1 pill as needed., Disp: 90 capsule, Rfl: 2    indapamide (Lozol) 2.5 mg tablet, Take 1 tablet (2.5 mg) by mouth once daily in the morning., Disp: , Rfl:     levothyroxine (Synthroid, Levoxyl) 50 mcg tablet, Take 1 tablet (50 mcg) by mouth once daily in the morning. Take before meals. Take on an empty stomach., Disp: , Rfl:     metFORMIN (Glucophage) 500 mg tablet, Take 1 tablet (500 mg) by mouth every other day., Disp: , Rfl:     metoprolol tartrate (Lopressor) 25 mg tablet, Take 0.5 tablets (12.5 mg) by mouth 2 times a day., Disp: , Rfl:     multivitamin tablet, Take 1 tablet by mouth once daily., Disp: , Rfl:     pantoprazole (ProtoNix) 40 mg EC tablet, Take 1 tablet (40 mg) by mouth once daily in the morning. Take before meals. Do not crush, chew, or split., Disp: , Rfl:     saccharomyces boulardii (Florastor) 250 mg capsule, Take 1 capsule (250 mg) by mouth once daily., Disp: , Rfl:     simvastatin (Zocor) 40 mg tablet, Take 1 tablet (40 mg) by mouth once  daily at bedtime., Disp: , Rfl:     tiZANidine (Zanaflex) 4 mg tablet, Take 1 tablet (4 mg) by mouth once daily as needed., Disp: , Rfl:     triamcinolone (Kenalog) 0.1 % ointment, Apply 1 Application topically 2 times a day., Disp: , Rfl:      Review of Systems   Constitutional: Negative.    HENT: Negative.     Eyes: Negative.    Respiratory: Negative.     Cardiovascular: Negative.    Gastrointestinal: Negative.    Endocrine: Negative.    Genitourinary: Negative.    Musculoskeletal:  Positive for arthralgias, back pain, myalgias and neck pain. Negative for gait problem, joint swelling and neck stiffness.   Skin: Negative.    Allergic/Immunologic: Negative.    Neurological: Negative.    Psychiatric/Behavioral: Negative.          Physical Exam  Vitals and nursing note reviewed.   HENT:      Head: Normocephalic.      Nose: Nose normal.   Eyes:      Extraocular Movements: Extraocular movements intact.      Conjunctiva/sclera: Conjunctivae normal.      Pupils: Pupils are equal, round, and reactive to light.   Cardiovascular:      Rate and Rhythm: Normal rate and regular rhythm.   Pulmonary:      Effort: Pulmonary effort is normal.      Breath sounds: Normal breath sounds.   Musculoskeletal:         General: Tenderness present. No swelling, deformity or signs of injury.      Cervical back: No rigidity or tenderness.      Lumbar back: Spasms and tenderness present.      Right lower leg: No edema.      Left lower leg: No edema.      Comments: No neck rigidity.  Full range of motion but with discomfort with lateral flexion.  Positive for Spurling test bilaterally.  Positive for paraspinal tenderness at the cervical region bilaterally at C4-C5, C5-C6, C6-C7.  With nonspecific radicular symptoms.  Positive for minimal tenderness on palpation at the right shoulder joints.    Limited range of motion of the arms due to pain.  BUE 4/5.   Skin:     General: Skin is warm and dry.   Neurological:      General: No focal deficit  present.      Mental Status: She is alert and oriented to person, place, and time.   Psychiatric:         Mood and Affect: Mood normal.         Behavior: Behavior normal.          Last Recorded Vitals  /83   Pulse 69   Temp 36.7 °C (98 °F)   Wt 101 kg (221 lb 12.5 oz)     Relevant Results    Pain Management Panel  More data exists         Latest Ref Rng & Units 5/26/2023 10/28/2022   Pain Management Panel   Amphetamine Screen, Urine NEGATIVE PRESUMPTIVE NEGATIVE  PRESUMPTIVE NEGATIVE    Barbiturate Screen, Urine NEGATIVE PRESUMPTIVE NEGATIVE  PRESUMPTIVE NEGATIVE    Fentanyl Screen, Urine NEGATIVE PRESUMPTIVE NEGATIVE  PRESUMPTIVE NEGATIVE    Methadone Screen, Urine NEGATIVE PRESUMPTIVE NEGATIVE  PRESUMPTIVE NEGATIVE         Assessment/Plan   Problem List Items Addressed This Visit             ICD-10-CM    Cervicalgia M54.2    Relevant Medications    gabapentin (Neurontin) 300 mg capsule    Other Relevant Orders    PT eval and treat     Other Visit Diagnoses         Codes    Facet arthropathy, cervical    -  Primary M47.812    Relevant Medications    gabapentin (Neurontin) 300 mg capsule    Other Relevant Orders    PT eval and treat    Cervical radiculitis     M54.12    Relevant Medications    gabapentin (Neurontin) 300 mg capsule    Other Relevant Orders    PT eval and treat    Chronic right shoulder pain     M25.511, G89.29    Relevant Medications    gabapentin (Neurontin) 300 mg capsule    Other Relevant Orders    PT eval and treat    Medication management     Z79.899    Relevant Orders    Drug Screen, Urine With Reflex to Confirmation (Completed)                       1. Facet arthropathy, cervical  - gabapentin (Neurontin) 300 mg capsule; Take 300 mg 3 times a day.  May take extra 1 pill as needed.  Dispense: 90 capsule; Refill: 2  - PT eval and treat    2. Cervical radiculitis  - gabapentin (Neurontin) 300 mg capsule; Take 300 mg 3 times a day.  May take extra 1 pill as needed.  Dispense: 90 capsule;  Refill: 2  - PT eval and treat    3. Cervicalgia  - gabapentin (Neurontin) 300 mg capsule; Take 300 mg 3 times a day.  May take extra 1 pill as needed.  Dispense: 90 capsule; Refill: 2  - PT eval and treat    4. Chronic right shoulder pain  - gabapentin (Neurontin) 300 mg capsule; Take 300 mg 3 times a day.  May take extra 1 pill as needed.  Dispense: 90 capsule; Refill: 2  - PT eval and treat    5. Medication management  - Drug Screen, Urine With Reflex to Confirmation       Plan/Follow-up Instructions:     I refilled your gabapentin.  Continue taking 300 mg 3 times a day.  You may take extra pill as needed.    I will see you for follow-up visit in 3 months, 1/9/2024 at 9:20 AM in [place].    Disclaimer: This note was created using voice recognition software. It was not corrected for typographical or grammatical errors, inadvertent word insertion, or any unintended errors. Please feel free to contact me for clarification.        Marlen Waite, HAM, APRN, FNP-C   [facility]  Office #: [phone]  Fax # [phone]

## 2023-12-01 ENCOUNTER — PHARMACY VISIT (OUTPATIENT)
Dept: PHARMACY | Facility: CLINIC | Age: 78
End: 2023-12-01
Payer: COMMERCIAL

## 2023-12-01 DIAGNOSIS — E11.319 DIABETIC RETINOPATHY ASSOCIATED WITH CONTROLLED TYPE 2 DIABETES MELLITUS (MULTI): Primary | ICD-10-CM

## 2023-12-01 PROCEDURE — RXMED WILLOW AMBULATORY MEDICATION CHARGE

## 2023-12-01 RX ORDER — DULAGLUTIDE 0.75 MG/.5ML
0.75 INJECTION, SOLUTION SUBCUTANEOUS
Qty: 6 ML | Refills: 1 | Status: SHIPPED | OUTPATIENT
Start: 2023-12-01 | End: 2024-02-02 | Stop reason: SDUPTHER

## 2024-01-14 DIAGNOSIS — M54.12 CERVICAL RADICULITIS: ICD-10-CM

## 2024-01-14 DIAGNOSIS — G89.29 CHRONIC RIGHT SHOULDER PAIN: ICD-10-CM

## 2024-01-14 DIAGNOSIS — M47.812 FACET ARTHROPATHY, CERVICAL: ICD-10-CM

## 2024-01-14 DIAGNOSIS — M25.511 CHRONIC RIGHT SHOULDER PAIN: ICD-10-CM

## 2024-01-14 DIAGNOSIS — M54.2 CERVICALGIA: ICD-10-CM

## 2024-01-15 RX ORDER — GABAPENTIN 300 MG/1
CAPSULE ORAL
Qty: 90 CAPSULE | Refills: 0 | Status: SHIPPED | OUTPATIENT
Start: 2024-01-15 | End: 2024-03-08 | Stop reason: SDUPTHER

## 2024-01-19 ENCOUNTER — TELEPHONE (OUTPATIENT)
Dept: PRIMARY CARE | Facility: CLINIC | Age: 79
End: 2024-01-19
Payer: MEDICARE

## 2024-01-19 ENCOUNTER — APPOINTMENT (OUTPATIENT)
Dept: PAIN MEDICINE | Facility: HOSPITAL | Age: 79
End: 2024-01-19
Payer: MEDICARE

## 2024-01-22 ENCOUNTER — OFFICE VISIT (OUTPATIENT)
Dept: PRIMARY CARE | Facility: CLINIC | Age: 79
End: 2024-01-22
Payer: MEDICARE

## 2024-01-22 VITALS
OXYGEN SATURATION: 95 % | TEMPERATURE: 97.1 F | DIASTOLIC BLOOD PRESSURE: 64 MMHG | RESPIRATION RATE: 18 BRPM | HEIGHT: 61 IN | HEART RATE: 74 BPM | SYSTOLIC BLOOD PRESSURE: 116 MMHG | BODY MASS INDEX: 41.91 KG/M2

## 2024-01-22 DIAGNOSIS — K21.9 GASTROESOPHAGEAL REFLUX DISEASE WITHOUT ESOPHAGITIS: ICD-10-CM

## 2024-01-22 DIAGNOSIS — E11.319 DIABETIC RETINOPATHY ASSOCIATED WITH CONTROLLED TYPE 2 DIABETES MELLITUS (MULTI): Primary | ICD-10-CM

## 2024-01-22 DIAGNOSIS — E03.9 ACQUIRED HYPOTHYROIDISM: ICD-10-CM

## 2024-01-22 DIAGNOSIS — I10 ESSENTIAL HYPERTENSION: ICD-10-CM

## 2024-01-22 DIAGNOSIS — G62.9 POLYNEUROPATHY: ICD-10-CM

## 2024-01-22 DIAGNOSIS — E55.9 VITAMIN D DEFICIENCY: ICD-10-CM

## 2024-01-22 DIAGNOSIS — E78.1 HIGH TRIGLYCERIDES: ICD-10-CM

## 2024-01-22 PROCEDURE — 1125F AMNT PAIN NOTED PAIN PRSNT: CPT | Performed by: NURSE PRACTITIONER

## 2024-01-22 PROCEDURE — 99349 HOME/RES VST EST MOD MDM 40: CPT | Performed by: NURSE PRACTITIONER

## 2024-01-22 PROCEDURE — 3074F SYST BP LT 130 MM HG: CPT | Performed by: NURSE PRACTITIONER

## 2024-01-22 PROCEDURE — 1036F TOBACCO NON-USER: CPT | Performed by: NURSE PRACTITIONER

## 2024-01-22 PROCEDURE — 1159F MED LIST DOCD IN RCRD: CPT | Performed by: NURSE PRACTITIONER

## 2024-01-22 PROCEDURE — 1160F RVW MEDS BY RX/DR IN RCRD: CPT | Performed by: NURSE PRACTITIONER

## 2024-01-22 PROCEDURE — 3078F DIAST BP <80 MM HG: CPT | Performed by: NURSE PRACTITIONER

## 2024-01-22 RX ORDER — FUROSEMIDE 20 MG/1
20 TABLET ORAL DAILY
COMMUNITY
Start: 2024-01-19 | End: 2024-03-08 | Stop reason: ALTCHOICE

## 2024-01-22 RX ORDER — ASPIRIN 325 MG
500 TABLET, DELAYED RELEASE (ENTERIC COATED) ORAL 2 TIMES DAILY
Qty: 90 CAPSULE | Refills: 3 | Status: SHIPPED | OUTPATIENT
Start: 2024-01-22

## 2024-01-22 ASSESSMENT — PAIN SCALES - GENERAL: PAINLEVEL: 4

## 2024-01-22 NOTE — PROGRESS NOTES
Subjective   Patient ID: Sara Zavala is a 78 y.o. female who presents for Follow-up (DM, HTN, Edema).    Visit for 79 y/o female seen today in private home, alone for routine follow up of chronic medical conditions. She is sitting on recliner with legs dependent, wearing mask this morning. She is alert, oriented, answering all questions without difficulty. She lives in a single story home with her brother. She has a vehicle and is able to drive very short distances but she does not drive in inclement weather and reports that her brother drives most of the time due to her declining health. She is ambulatory short distances. She uses a cane in the house but mostly uses the walker if she goes out. She denies recent fall or injury. Patient is able to manage her own medications. She gets all of her medications from the local [facility] but has been getting her Trulicity from [facility] due to better pricing. She reports that her prescription coverage changed this month and she has [facility] now but she is hoping to stay with [facility] as it is easier for her. She reports having a dental appt scheduled 2/28/24.     Patient reports that she typically eats 2 meals a day and will have snacks. Her brother does all of the meal preparation in the home. She did bake a cake the other day. She denies appetite changes, signs of weight loss. She does not have a scale in the home to monitor her weight. Denies abdominal pain, nausea, vomiting. She reports that her constipation has been under control and she has not needed to take any medication to help have a BM. She is taking a probiotic which has helped. She denies any bladder issues.     Chronic pain- remains active with Dr. Minaya, pain management. She will see Marlen Waite most of the time. Reports that her pain is mostly in lower back, right shoulder and bilateral knees. She takes Gabapentin and reports it to be helpful. She is also using Voltaren gel, mostly on the  right shoulder. Next follow up is 3/8/24.     HTN- remains active with cardiologist Dr. Young. She had a follow up in December and saw the STEPHANIE. She reports that she had some mild swelling in her leg and was given a prescription for Furosemide but only took 1 dose and has been fine since. She has the medication on her bedside table but has not used it. She denies any headaches, chest pain, palpitations, increased shortness of breath, edema or signs of weight gain. Patient reports that she has a home BP cuff but has not checked her BP  recently as she is uncertain if the cuff reads correctly or not.     DM- patient monitors glucose levels daily. She is taking Metformin every other day and Trulicity weekly. Last A1c was 5.8. She denies any hypoglycemic events. Her podiatrist is Dr. Zee. She follows with opththamologist Dr Watts annually.     Home Visit:          Medically necessary due to: Illness or condition that results in activity lmitation or restriction that impacts the ability to leave home such as:, unsteady gait/poor balance         Current Outpatient Medications:     aspirin 81 mg EC tablet, Take 1 tablet (81 mg) by mouth once daily., Disp: , Rfl:     calcium citrate/vitamin D3 (CALCIUM CITRATE + D ORAL), as directed Orally, Disp: , Rfl:     clobetasol (Temovate) 0.05 % ointment, Apply 1 Application topically 2 times a day., Disp: , Rfl:     clotrimazole (Lotrimin) 1 % cream, APPLY 1 APPLICATION EXTERNALLY TWICE A DAY, Disp: , Rfl:     cranberry extract 425 mg capsule, as directed Orally, Disp: , Rfl:     cyanocobalamin (Vitamin B-12) 1,000 mcg tablet, Take 1 tablet (1,000 mcg) by mouth., Disp: , Rfl:     diclofenac sodium (Voltaren) 1 % gel gel, APPLY SPARINGLY TO THE AFFECTED AREA 2 TO 3 TIMES A DAY AS NEEDED FOR PAIN., Disp: , Rfl:     diphenoxylate-atropine (Lomotil) 2.5-0.025 mg tablet, Take 1 tablet by mouth 4 times a day as needed., Disp: , Rfl:     docusate sodium (Colace) 100 mg capsule, Take 1  capsule (100 mg) by mouth once daily as needed., Disp: , Rfl:     dulaglutide (Trulicity) 0.75 mg/0.5 mL pen injector, INJECT 1 PEN-INJECTOR UNDER THE SKIN ONCE EVERY WEEK, Disp: 6 mL, Rfl: 1    escitalopram (Lexapro) 10 mg tablet, Take 1 tablet (10 mg) by mouth once daily., Disp: , Rfl:     furosemide (Lasix) 20 mg tablet, Take 1 tablet (20 mg) by mouth once daily., Disp: , Rfl:     gabapentin (Neurontin) 300 mg capsule, TAKE ONE CAPSULE BY MOUTH THREE TIMES A DAY. MAY TAKE 1 ADDTIONAL CAPSULE BY MOUTH AS NEEDED., Disp: 90 capsule, Rfl: 0    indapamide (Lozol) 2.5 mg tablet, Take 1 tablet (2.5 mg) by mouth once daily in the morning., Disp: , Rfl:     levothyroxine (Synthroid, Levoxyl) 50 mcg tablet, Take 1 tablet (50 mcg) by mouth once daily in the morning. Take before meals. Take on an empty stomach., Disp: , Rfl:     metFORMIN (Glucophage) 500 mg tablet, Take 1 tablet (500 mg) by mouth every other day., Disp: , Rfl:     metoprolol tartrate (Lopressor) 25 mg tablet, Take 0.5 tablets (12.5 mg) by mouth 2 times a day., Disp: , Rfl:     multivitamin tablet, Take 1 tablet by mouth once daily., Disp: , Rfl:     omega-3 (Fish OiL) 60- mg capsule, Take 1 capsule (500 mg) by mouth 2 times a day., Disp: 90 capsule, Rfl: 3    pantoprazole (ProtoNix) 40 mg EC tablet, Take 1 tablet (40 mg) by mouth once daily in the morning. Take before meals. Do not crush, chew, or split., Disp: , Rfl:     saccharomyces boulardii (Florastor) 250 mg capsule, Take 1 capsule (250 mg) by mouth once daily., Disp: , Rfl:     simvastatin (Zocor) 40 mg tablet, Take 1 tablet (40 mg) by mouth once daily at bedtime., Disp: , Rfl:     tiZANidine (Zanaflex) 4 mg tablet, Take 1 tablet (4 mg) by mouth once daily as needed., Disp: , Rfl:     triamcinolone (Kenalog) 0.1 % ointment, Apply 1 Application topically 2 times a day., Disp: , Rfl:      Review of Systems  Constitutional:  Negative for appetite change, chills, fever and unexpected weight change.  "  HENT:  Negative for trouble swallowing.    Respiratory:  Positive for shortness of breath on exertion. Negative for wheezing.    Cardiovascular:  Negative for chest pain, palpitations and leg swelling.   Gastrointestinal:  Positive for occasional constipation. Negative for abdominal pain, nausea and vomiting.   Endocrine: Positive for diabetes, thyroid disease  Genitourinary:  Negative for difficulty urinating.   Musculoskeletal:  Positive for arthralgias, unsteady gait. Chronic lower back, right shoulder, bilateral knee pain  Neurological:  Negative for dizziness and light-headedness.   Psychiatric/Behavioral: Positive for depression     Objective   /64 (BP Location: Left arm, Patient Position: Sitting, BP Cuff Size: Adult)   Pulse 74   Temp 36.2 °C (97.1 °F) (Temporal)   Resp 18   Ht 1.549 m (5' 1\")   SpO2 95%   BMI 41.91 kg/m²     Physical Exam  Constitutional:       General: She is not in acute distress.     Appearance: Normal appearance. She is overweight      Comments: Sitting in recliner, legs dependent    HENT:      Head: Normocephalic and atraumatic.      Nose: Nose normal.      Mouth/Throat:      Mouth: Mucous membranes are moist.      Pharynx: Oropharynx is clear.   Eyes:      Extraocular Movements: Extraocular movements intact.      Pupils: Pupils are equal, round, and reactive to light.      Comments: wearing glasses   Cardiovascular:      Rate and Rhythm: Normal rate and regular rhythm.      Heart sounds: Normal heart sounds. No murmur heard.     No friction rub. No gallop.   Pulmonary:      Effort: Pulmonary effort is normal. No respiratory distress.      Breath sounds: Normal breath sounds. No wheezing, rhonchi or rales.   Abdominal:      General: Bowel sounds are normal. There is no distension.      Palpations: Abdomen is soft.      Comments: hernia present    Musculoskeletal:         General: modest deformity of bilateral knees      Cervical back: Neck supple.      Right lower leg: No " edema.      Left lower leg: No edema.   Skin:     General: Skin is warm and dry.   Neurological:      General: No focal deficit present.      Mental Status: She is alert and oriented to person, place, and time.      Motor: generalized weakness      Gait: Unsteady    Psychiatric:         Mood and Affect: Mood normal.         Behavior: Behavior normal.     Assessment/Plan   Diagnoses and all orders for this visit:  Diabetic retinopathy associated with controlled type 2 diabetes mellitus (CMS/[facility])  Comments:  chronic, glucose levels well controlled. Continue metformin every other day, trulicity weekly  Gastroesophageal reflux disease without esophagitis  Comments:  chronic, stable, continue pantoprazole  Essential hypertension  Comments:  chronic, vitals stable, continue aspirin, metoprolol  High triglycerides  -     omega-3 (Fish OiL) 60- mg capsule; Take 1 capsule (500 mg) by mouth 2 times a day.  Acquired hypothyroidism  Comments:  chronic, last TSH WNL. Continue Levothyroxine  Vitamin D deficiency  Comments:  chronic, stable, continue vitamin d supplement  Polyneuropathy  Comments:  chronic,managed with gabapentin, tylenol and voltaren gel. Follow up with pain mgmt as scheduled    Patient stable. Will continue house calls NP program due to limited mobility, increased difficulty getting out to see PCP. Advised pt to contact house calls office with any acute concerns or medication needs.     Joi Breen, APRN-CNP

## 2024-02-02 DIAGNOSIS — E11.319 DIABETIC RETINOPATHY ASSOCIATED WITH CONTROLLED TYPE 2 DIABETES MELLITUS (MULTI): ICD-10-CM

## 2024-02-02 RX ORDER — DULAGLUTIDE 0.75 MG/.5ML
0.75 INJECTION, SOLUTION SUBCUTANEOUS
Qty: 6 ML | Refills: 3 | Status: SHIPPED | OUTPATIENT
Start: 2024-02-02

## 2024-02-19 ENCOUNTER — TELEPHONE (OUTPATIENT)
Dept: PRIMARY CARE | Facility: CLINIC | Age: 79
End: 2024-02-19
Payer: MEDICARE

## 2024-02-19 DIAGNOSIS — R09.89 CHEST CONGESTION: Primary | ICD-10-CM

## 2024-02-19 RX ORDER — AMOXICILLIN AND CLAVULANATE POTASSIUM 875; 125 MG/1; MG/1
875 TABLET, FILM COATED ORAL 2 TIMES DAILY
Qty: 20 TABLET | Refills: 0 | Status: SHIPPED | OUTPATIENT
Start: 2024-02-19 | End: 2024-03-08 | Stop reason: ALTCHOICE

## 2024-02-19 NOTE — TELEPHONE ENCOUNTER
Pt states she has had cough for about a week. She noticed chest congestion the last two days. Denies fever or chest pain. Pt does c/o SOB.

## 2024-03-08 ENCOUNTER — OFFICE VISIT (OUTPATIENT)
Dept: PAIN MEDICINE | Facility: HOSPITAL | Age: 79
End: 2024-03-08
Payer: MEDICARE

## 2024-03-08 VITALS
BODY MASS INDEX: 41.85 KG/M2 | HEIGHT: 62 IN | DIASTOLIC BLOOD PRESSURE: 72 MMHG | WEIGHT: 227.4 LBS | SYSTOLIC BLOOD PRESSURE: 131 MMHG | HEART RATE: 68 BPM | TEMPERATURE: 97.5 F

## 2024-03-08 DIAGNOSIS — G89.29 CHRONIC PAIN OF RIGHT KNEE: ICD-10-CM

## 2024-03-08 DIAGNOSIS — M25.511 CHRONIC RIGHT SHOULDER PAIN: Primary | ICD-10-CM

## 2024-03-08 DIAGNOSIS — M54.12 CERVICAL RADICULITIS: ICD-10-CM

## 2024-03-08 DIAGNOSIS — Z79.899 MEDICATION MANAGEMENT: ICD-10-CM

## 2024-03-08 DIAGNOSIS — G89.29 CHRONIC RIGHT SHOULDER PAIN: Primary | ICD-10-CM

## 2024-03-08 DIAGNOSIS — M19.019 ARTHRITIS, SHOULDER REGION: ICD-10-CM

## 2024-03-08 DIAGNOSIS — M25.561 CHRONIC PAIN OF RIGHT KNEE: ICD-10-CM

## 2024-03-08 LAB
AMPHETAMINES UR QL SCN: NORMAL
BARBITURATES UR QL SCN: NORMAL
BENZODIAZ UR QL SCN: NORMAL
BZE UR QL SCN: NORMAL
CANNABINOIDS UR QL SCN: NORMAL
FENTANYL+NORFENTANYL UR QL SCN: NORMAL
METHADONE UR QL SCN: NORMAL
OPIATES UR QL SCN: NORMAL
OXYCODONE+OXYMORPHONE UR QL SCN: NORMAL
PCP UR QL SCN: NORMAL

## 2024-03-08 PROCEDURE — 80307 DRUG TEST PRSMV CHEM ANLYZR: CPT | Performed by: NURSE PRACTITIONER

## 2024-03-08 PROCEDURE — 1160F RVW MEDS BY RX/DR IN RCRD: CPT | Performed by: NURSE PRACTITIONER

## 2024-03-08 PROCEDURE — 99214 OFFICE O/P EST MOD 30 MIN: CPT | Mod: ZK | Performed by: NURSE PRACTITIONER

## 2024-03-08 PROCEDURE — 1159F MED LIST DOCD IN RCRD: CPT | Performed by: NURSE PRACTITIONER

## 2024-03-08 PROCEDURE — 99214 OFFICE O/P EST MOD 30 MIN: CPT | Performed by: NURSE PRACTITIONER

## 2024-03-08 PROCEDURE — 3075F SYST BP GE 130 - 139MM HG: CPT | Performed by: NURSE PRACTITIONER

## 2024-03-08 PROCEDURE — 1036F TOBACCO NON-USER: CPT | Performed by: NURSE PRACTITIONER

## 2024-03-08 PROCEDURE — 1125F AMNT PAIN NOTED PAIN PRSNT: CPT | Performed by: NURSE PRACTITIONER

## 2024-03-08 PROCEDURE — 3078F DIAST BP <80 MM HG: CPT | Performed by: NURSE PRACTITIONER

## 2024-03-08 RX ORDER — GABAPENTIN 300 MG/1
CAPSULE ORAL
Qty: 90 CAPSULE | Refills: 3 | Status: SHIPPED | OUTPATIENT
Start: 2024-03-08 | End: 2024-05-31 | Stop reason: SDUPTHER

## 2024-03-08 ASSESSMENT — ENCOUNTER SYMPTOMS
RESPIRATORY NEGATIVE: 1
CONSTITUTIONAL NEGATIVE: 1
ALLERGIC/IMMUNOLOGIC NEGATIVE: 1
EYES NEGATIVE: 1
JOINT SWELLING: 0
PSYCHIATRIC NEGATIVE: 1
NECK PAIN: 1
ENDOCRINE NEGATIVE: 1
CARDIOVASCULAR NEGATIVE: 1
NECK STIFFNESS: 0
ARTHRALGIAS: 1
NEUROLOGICAL NEGATIVE: 1
MYALGIAS: 1
GASTROINTESTINAL NEGATIVE: 1

## 2024-03-08 ASSESSMENT — PAIN SCALES - GENERAL: PAINLEVEL: 4

## 2024-03-08 NOTE — PATIENT INSTRUCTIONS
Continue taking gabapentin 300 mg 3 times a day.    I ordered x-ray to your right knee and you may have it done anytime.    You are scheduled for right shoulder injection on 3/26/2024 in [place] with Dr. Cullen.  No aspirin this day.  The office will call you for further instructions.  You will then be seen for your postprocedure follow-up in 6 to 8 weeks.

## 2024-03-08 NOTE — PROGRESS NOTES
Subjective   Patient ID: Sara Zavala is a 78 y.o. female who presents for Follow-up (Right shoulder pain).    Sara Guevara is a pleasant 78-year-old  female who is here for a follow-up visit.  Patient presents in a wheelchair.  She ambulates short distances using her cane.  She arrives in the room by herself.  She is accompanied by her brother who was seen today.    Patient continues to have chronic right shoulder pain.  She also has pain to her right knee.  She rates her pain as 4 out of 10 with rest.  Pain increases to 8 out of 10 with activities.  Right shoulder pain is constant.  Pain to her right knee comes and goes.  She describes her pain as aching, burning, spastic and throbbing kind of pain.  Patient is right-handed and she reports that she was doing a lot of paperwork which aggravated her shoulder pain.  She has prickly sensation to the side of her right knee.  This only happens when her leg is up when she is sitting in her recliner.  She continues to have numbness, pins-and-needles sensation to her hands.  She denies increasing arm weakness, weakness in  or dropping objects.  She denies increasing leg weakness or change in balance.  She denies bowel or bladder incontinence.  She denies recent falls, injuries, ER visits.  There has been no change since she was last seen.    Patient continues to take gabapentin 300 mg 3 times a day.  She takes Tylenol as needed for pain relief.  She denies side effects from her medications.    Patient reports that her right eye is getting blurry.  She saw her ophthalmologist.  The plan is to do a laser surgery.  She does not know when will that be.    I discussed the plan of care.  Patient would like right shoulder injection as it helped her in the past.  This is done under fluoroscopy.  Questions were answered during this encounter.        Past Medical History  She has a past medical history of Abdominal hernia, Anemia, iron deficiency, Anxiety disorder,  Atherosclerotic heart disease of native coronary artery without angina pectoris, Chronic back pain, Diabetes (CMS/HCC), Diabetic neuropathy (CMS/HCC), Diabetic retinopathy (CMS/HCC), Enlarged uterus, Hyperlipidemia, Hypertension, Hypothyroidism, Left ventricular ejection fraction greater than 40% (07/22/2019), Low back pain, Morbid obesity (CMS/HCC), Other specified diabetes mellitus without complications (CMS/HCC), Peptic ulcer disease, Personal history of other diseases of the circulatory system, Psoriasis, Vitamin B12 deficiency, and Vitamin D deficiency.    Surgical History  Past Surgical History:   Procedure Laterality Date    APPENDECTOMY  2005    CARDIAC CATHETERIZATION  05/03/2017    COLONOSCOPY  08/04/2010    COLONOSCOPY  07/31/2015    EPIDURAL BLOCK INJECTION  09/21/2010    EPIDURAL BLOCK INJECTION  10/2013    Dr. Cote    OTHER SURGICAL HISTORY      Carpal tunnel surgery    OTHER SURGICAL HISTORY      Phacoemulsification of cataract and insertion of intraocular lens    OTHER SURGICAL HISTORY  2004    Bariatric surgery - gastric bypass    OTHER SURGICAL HISTORY  1961    Incision and drainage of pilonidal cyst, also 1962    OTHER SURGICAL HISTORY  10/26/2019    Pacemaker insertion - [facility] Dr. Florez    OTHER SURGICAL HISTORY  2005    Cholecystectomy laparoscopic    PILONIDAL CYST DRAINAGE  1961    PILONIDAL CYST DRAINAGE  1962    SINUS SURGERY  10/2013    sack of bacteria from left sinus (removed)    TOE SURGERY  1985    4 toe nails removed    UMBILICAL HERNIA REPAIR  1986        Social History  She reports that she has never smoked. She has never been exposed to tobacco smoke. She has never used smokeless tobacco. She reports that she does not currently use alcohol. She reports that she does not use drugs.    Family History  Family History   Problem Relation Name Age of Onset    Diabetes Mother      Heart disease Mother      Hypertension Mother      Heart disease Father      Stroke Father       Hypertension Sister      Mental illness Brother      Diabetes Brother      Hypertension Brother          Allergies  Ace inhibitors and Sulfamethoxazole-trimethoprim      Current Outpatient Medications:     aspirin 81 mg EC tablet, Take 1 tablet (81 mg) by mouth once daily., Disp: , Rfl:     calcium citrate/vitamin D3 (CALCIUM CITRATE + D ORAL), as directed Orally, Disp: , Rfl:     clobetasol (Temovate) 0.05 % ointment, Apply 1 Application topically 2 times a day., Disp: , Rfl:     clotrimazole (Lotrimin) 1 % cream, APPLY 1 APPLICATION EXTERNALLY TWICE A DAY, Disp: , Rfl:     cranberry extract 425 mg capsule, as directed Orally, Disp: , Rfl:     cyanocobalamin (Vitamin B-12) 1,000 mcg tablet, Take 1 tablet (1,000 mcg) by mouth., Disp: , Rfl:     diclofenac sodium (Voltaren) 1 % gel gel, APPLY SPARINGLY TO THE AFFECTED AREA 2 TO 3 TIMES A DAY AS NEEDED FOR PAIN., Disp: , Rfl:     diphenoxylate-atropine (Lomotil) 2.5-0.025 mg tablet, Take 1 tablet by mouth 4 times a day as needed., Disp: , Rfl:     docusate sodium (Colace) 100 mg capsule, Take 1 capsule (100 mg) by mouth once daily as needed., Disp: , Rfl:     dulaglutide (Trulicity) 0.75 mg/0.5 mL pen injector, INJECT 1 PEN-INJECTOR UNDER THE SKIN ONCE EVERY WEEK, Disp: 6 mL, Rfl: 3    escitalopram (Lexapro) 10 mg tablet, Take 1 tablet (10 mg) by mouth once daily., Disp: , Rfl:     indapamide (Lozol) 2.5 mg tablet, Take 1 tablet (2.5 mg) by mouth once daily in the morning., Disp: , Rfl:     levothyroxine (Synthroid, Levoxyl) 50 mcg tablet, Take 1 tablet (50 mcg) by mouth once daily in the morning. Take before meals. Take on an empty stomach., Disp: , Rfl:     metFORMIN (Glucophage) 500 mg tablet, Take 1 tablet (500 mg) by mouth every other day., Disp: , Rfl:     metoprolol tartrate (Lopressor) 25 mg tablet, Take 0.5 tablets (12.5 mg) by mouth 2 times a day., Disp: , Rfl:     multivitamin tablet, Take 1 tablet by mouth once daily., Disp: , Rfl:     omega-3 (Fish OiL)  60- mg capsule, Take 1 capsule (500 mg) by mouth 2 times a day., Disp: 90 capsule, Rfl: 3    pantoprazole (ProtoNix) 40 mg EC tablet, Take 1 tablet (40 mg) by mouth once daily in the morning. Take before meals. Do not crush, chew, or split., Disp: , Rfl:     saccharomyces boulardii (Florastor) 250 mg capsule, Take 1 capsule (250 mg) by mouth once daily., Disp: , Rfl:     simvastatin (Zocor) 40 mg tablet, Take 1 tablet (40 mg) by mouth once daily at bedtime., Disp: , Rfl:     tiZANidine (Zanaflex) 4 mg tablet, Take 1 tablet (4 mg) by mouth once daily as needed., Disp: , Rfl:     triamcinolone (Kenalog) 0.1 % ointment, Apply 1 Application topically 2 times a day., Disp: , Rfl:     gabapentin (Neurontin) 300 mg capsule, TAKE ONE CAPSULE BY MOUTH THREE TIMES A DAY. MAY TAKE 1 ADDTIONAL CAPSULE BY MOUTH AS NEEDED., Disp: 90 capsule, Rfl: 3     Review of Systems   Constitutional: Negative.    HENT: Negative.     Eyes: Negative.    Respiratory: Negative.     Cardiovascular: Negative.    Gastrointestinal: Negative.    Endocrine: Negative.    Genitourinary: Negative.    Musculoskeletal:  Positive for arthralgias, myalgias and neck pain. Negative for gait problem, joint swelling and neck stiffness.   Skin: Negative.    Allergic/Immunologic: Negative.    Neurological: Negative.    Psychiatric/Behavioral: Negative.          Physical Exam  Vitals and nursing note reviewed.   HENT:      Head: Normocephalic.      Nose: Nose normal.   Eyes:      Extraocular Movements: Extraocular movements intact.      Conjunctiva/sclera: Conjunctivae normal.      Pupils: Pupils are equal, round, and reactive to light.   Cardiovascular:      Rate and Rhythm: Normal rate and regular rhythm.   Pulmonary:      Effort: Pulmonary effort is normal.      Breath sounds: Normal breath sounds.   Musculoskeletal:         General: Tenderness present. No swelling, deformity or signs of injury.      Cervical back: No rigidity or tenderness.      Lumbar  back: Tenderness present.      Right lower leg: No edema.      Left lower leg: No edema.      Comments: No neck rigidity.  Full range of motion but with discomfort with lateral flexion.  Positive for Spurling test bilaterally.  Positive for paraspinal tenderness at the cervical region.  With nonspecific radicular symptoms.  Positive for diffuse tenderness on palpation at the right shoulder joints including AC and GH joints.  Limited range of motion of the right arm due to the pain.  There is pain with elevation.  BUE 3-4/5, BLE 3-4/5.   Skin:     General: Skin is warm and dry.   Neurological:      General: No focal deficit present.      Mental Status: She is alert and oriented to person, place, and time.   Psychiatric:         Mood and Affect: Mood normal.         Behavior: Behavior normal.          Last Recorded Vitals  /72   Pulse 68   Temp 36.4 °C (97.5 °F) (Temporal)   Wt 103 kg (227 lb 6.5 oz)     Relevant Results      Pain Management Panel  More data exists         Latest Ref Rng & Units 10/27/2023 5/26/2023   Pain Management Panel   Amphetamine Screen, Urine Presumptive Negative Presumptive Negative  PRESUMPTIVE NEGATIVE    Barbiturate Screen, Urine Presumptive Negative Presumptive Negative  PRESUMPTIVE NEGATIVE    Benzodiazepines Screen, Urine Presumptive Negative Presumptive Negative  -   Fentanyl Screen, Urine Presumptive Negative Presumptive Negative  PRESUMPTIVE NEGATIVE    Methadone Screen, Urine NEGATIVE - PRESUMPTIVE NEGATIVE           Narrative & Impression   MRN: [id number]  Patient Name: ZACKERY CALVILLO     STUDY:  SHOULDER, CMPLT, MIN 2 VIEWS;  9/16/2022 2:40 pm     INDICATION:  Increasing right shoulder pain.  History of fall where she landed on  her right shoulder.  M25.519: Shoulder pain.     COMPARISON:  None.     ACCESSION NUMBER(S):  [id number]     ORDERING CLINICIAN:  THOMAS MCKEON     FINDINGS:  Right shoulder, four views     There is severe joint space narrowing with sclerosis  and  osteophytosis in the glenohumeral joint. Moderate narrowing  osteophytosis in the AC joint. There is no fracture. There is no  dislocation. There is diffuse osteopenia however     IMPRESSION:  Advanced glenohumeral joint osteoarthritis         Assessment/Plan   Problem List Items Addressed This Visit             ICD-10-CM    Cervical radiculitis M54.12    Relevant Medications    gabapentin (Neurontin) 300 mg capsule    Chronic right shoulder pain - Primary M25.511, G89.29    Relevant Medications    gabapentin (Neurontin) 300 mg capsule    Other Relevant Orders    FL fluoro images no charge    Arthrocentesis    Arthritis, shoulder region M19.019    Relevant Medications    gabapentin (Neurontin) 300 mg capsule    Other Relevant Orders    Arthrocentesis     Other Visit Diagnoses         Codes    Chronic pain of right knee     M25.561, G89.29    Relevant Medications    gabapentin (Neurontin) 300 mg capsule    Other Relevant Orders    XR knee right 4+ views    Medication management     Z79.899    Relevant Orders    Drug Screen, Urine With Reflex to Confirmation (Completed)                   1. Chronic right shoulder pain  - gabapentin (Neurontin) 300 mg capsule; TAKE ONE CAPSULE BY MOUTH THREE TIMES A DAY. MAY TAKE 1 ADDTIONAL CAPSULE BY MOUTH AS NEEDED.  Dispense: 90 capsule; Refill: 3  - FL fluoro images no charge; Future  - Arthrocentesis; Future    2. Arthritis, shoulder region  - gabapentin (Neurontin) 300 mg capsule; TAKE ONE CAPSULE BY MOUTH THREE TIMES A DAY. MAY TAKE 1 ADDTIONAL CAPSULE BY MOUTH AS NEEDED.  Dispense: 90 capsule; Refill: 3  - Arthrocentesis; Future    3. Cervical radiculitis  - gabapentin (Neurontin) 300 mg capsule; TAKE ONE CAPSULE BY MOUTH THREE TIMES A DAY. MAY TAKE 1 ADDTIONAL CAPSULE BY MOUTH AS NEEDED.  Dispense: 90 capsule; Refill: 3    4. Chronic pain of right knee  - gabapentin (Neurontin) 300 mg capsule; TAKE ONE CAPSULE BY MOUTH THREE TIMES A DAY. MAY TAKE 1 ADDTIONAL CAPSULE BY  MOUTH AS NEEDED.  Dispense: 90 capsule; Refill: 3  - XR knee right 4+ views    5. Medication management  - Drug Screen, Urine With Reflex to Confirmation; Future  - Drug Screen, Urine With Reflex to Confirmation       Plan/Follow-up Instructions:     Continue taking gabapentin 300 mg 3 times a day.    I ordered x-ray to your right knee and you may have it done anytime.    You are scheduled for right shoulder injection on 3/26/2024 in [place] with Dr. Cullen.  No aspirin this day.  The office will call you for further instructions.  You will then be seen for your postprocedure follow-up in 6 to 8 weeks.      Disclaimer: This note was created using voice recognition software. It was not corrected for typographical or grammatical errors, inadvertent word insertion, or any unintended errors. Please feel free to contact me for clarification.        Marlen Waite, HAM, APRN, FNP-C    [facility]  Office #: [phone]  Fax # [phone]

## 2024-03-08 NOTE — PROGRESS NOTES
I have personally reviewed the [place] report for Sara Zavala    Date of the last Controlled Substance Agreement: 10/27/2023       Last urine drug screening date/ordered today: 03/08/24     Results of last screen: Results as expected.       Last opioid risk screening date/ordered today: 10/27/2023      Pain Scale Screening:   Pain Assessment and Documentation Tool (PADT)   Date of Assessment: 3/8/2024  Analgesia:   Patient reports her pain level on average during the past week is 3on a 0 - 10 scale.   Patient reports that her pain level at its worst during the past week was 9 on a 0 -10 scale.   50% of pain has been relieved during the past week per patient   Patient states that the amount of pain relief she is now obtaining from her current pain reliever(s) is enough to make a real difference in her life.   Query to clinician: Is the patient's pain relief clinically significant? yes  Activities of Daily Living:   Physical functioning: unchanged  Family relationships: unchanged  Social relationships: unchanged  Mood: unchanged  Sleep patterns: better  Overall functioning: better  Adverse Events: No, Sara Zavala is not experiencing side effects from current pain reliever.  Patients overall severity of side effect:none  Specific Analgesic Plan: Continue present regimen.

## 2024-03-08 NOTE — H&P (VIEW-ONLY)
Subjective   Patient ID: Sara Zavala is a 78 y.o. female who presents for Follow-up (Right shoulder pain).    Sara Guevara is a pleasant 78-year-old  female who is here for a follow-up visit.  Patient presents in a wheelchair.  She ambulates short distances using her cane.  She arrives in the room by herself.  She is accompanied by her brother who was seen today.    Patient continues to have chronic right shoulder pain.  She also has pain to her right knee.  She rates her pain as 4 out of 10 with rest.  Pain increases to 8 out of 10 with activities.  Right shoulder pain is constant.  Pain to her right knee comes and goes.  She describes her pain as aching, burning, spastic and throbbing kind of pain.  Patient is right-handed and she reports that she was doing a lot of paperwork which aggravated her shoulder pain.  She has prickly sensation to the side of her right knee.  This only happens when her leg is up when she is sitting in her recliner.  She continues to have numbness, pins-and-needles sensation to her hands.  She denies increasing arm weakness, weakness in  or dropping objects.  She denies increasing leg weakness or change in balance.  She denies bowel or bladder incontinence.  She denies recent falls, injuries, ER visits.  There has been no change since she was last seen.    Patient continues to take gabapentin 300 mg 3 times a day.  She takes Tylenol as needed for pain relief.  She denies side effects from her medications.    Patient reports that her right eye is getting blurry.  She saw her ophthalmologist.  The plan is to do a laser surgery.  She does not know when will that be.    I discussed the plan of care.  Patient would like right shoulder injection as it helped her in the past.  This is done under fluoroscopy.  Questions were answered during this encounter.        Past Medical History  She has a past medical history of Abdominal hernia, Anemia, iron deficiency, Anxiety disorder,  Atherosclerotic heart disease of native coronary artery without angina pectoris, Chronic back pain, Diabetes (CMS/HCC), Diabetic neuropathy (CMS/HCC), Diabetic retinopathy (CMS/HCC), Enlarged uterus, Hyperlipidemia, Hypertension, Hypothyroidism, Left ventricular ejection fraction greater than 40% (07/22/2019), Low back pain, Morbid obesity (CMS/HCC), Other specified diabetes mellitus without complications (CMS/HCC), Peptic ulcer disease, Personal history of other diseases of the circulatory system, Psoriasis, Vitamin B12 deficiency, and Vitamin D deficiency.    Surgical History  Past Surgical History:   Procedure Laterality Date    APPENDECTOMY  2005    CARDIAC CATHETERIZATION  05/03/2017    COLONOSCOPY  08/04/2010    COLONOSCOPY  07/31/2015    EPIDURAL BLOCK INJECTION  09/21/2010    EPIDURAL BLOCK INJECTION  10/2013    Dr. Cote    OTHER SURGICAL HISTORY      Carpal tunnel surgery    OTHER SURGICAL HISTORY      Phacoemulsification of cataract and insertion of intraocular lens    OTHER SURGICAL HISTORY  2004    Bariatric surgery - gastric bypass    OTHER SURGICAL HISTORY  1961    Incision and drainage of pilonidal cyst, also 1962    OTHER SURGICAL HISTORY  10/26/2019    Pacemaker insertion - [facility] Dr. Florez    OTHER SURGICAL HISTORY  2005    Cholecystectomy laparoscopic    PILONIDAL CYST DRAINAGE  1961    PILONIDAL CYST DRAINAGE  1962    SINUS SURGERY  10/2013    sack of bacteria from left sinus (removed)    TOE SURGERY  1985    4 toe nails removed    UMBILICAL HERNIA REPAIR  1986        Social History  She reports that she has never smoked. She has never been exposed to tobacco smoke. She has never used smokeless tobacco. She reports that she does not currently use alcohol. She reports that she does not use drugs.    Family History  Family History   Problem Relation Name Age of Onset    Diabetes Mother      Heart disease Mother      Hypertension Mother      Heart disease Father      Stroke Father       Hypertension Sister      Mental illness Brother      Diabetes Brother      Hypertension Brother          Allergies  Ace inhibitors and Sulfamethoxazole-trimethoprim      Current Outpatient Medications:     aspirin 81 mg EC tablet, Take 1 tablet (81 mg) by mouth once daily., Disp: , Rfl:     calcium citrate/vitamin D3 (CALCIUM CITRATE + D ORAL), as directed Orally, Disp: , Rfl:     clobetasol (Temovate) 0.05 % ointment, Apply 1 Application topically 2 times a day., Disp: , Rfl:     clotrimazole (Lotrimin) 1 % cream, APPLY 1 APPLICATION EXTERNALLY TWICE A DAY, Disp: , Rfl:     cranberry extract 425 mg capsule, as directed Orally, Disp: , Rfl:     cyanocobalamin (Vitamin B-12) 1,000 mcg tablet, Take 1 tablet (1,000 mcg) by mouth., Disp: , Rfl:     diclofenac sodium (Voltaren) 1 % gel gel, APPLY SPARINGLY TO THE AFFECTED AREA 2 TO 3 TIMES A DAY AS NEEDED FOR PAIN., Disp: , Rfl:     diphenoxylate-atropine (Lomotil) 2.5-0.025 mg tablet, Take 1 tablet by mouth 4 times a day as needed., Disp: , Rfl:     docusate sodium (Colace) 100 mg capsule, Take 1 capsule (100 mg) by mouth once daily as needed., Disp: , Rfl:     dulaglutide (Trulicity) 0.75 mg/0.5 mL pen injector, INJECT 1 PEN-INJECTOR UNDER THE SKIN ONCE EVERY WEEK, Disp: 6 mL, Rfl: 3    escitalopram (Lexapro) 10 mg tablet, Take 1 tablet (10 mg) by mouth once daily., Disp: , Rfl:     indapamide (Lozol) 2.5 mg tablet, Take 1 tablet (2.5 mg) by mouth once daily in the morning., Disp: , Rfl:     levothyroxine (Synthroid, Levoxyl) 50 mcg tablet, Take 1 tablet (50 mcg) by mouth once daily in the morning. Take before meals. Take on an empty stomach., Disp: , Rfl:     metFORMIN (Glucophage) 500 mg tablet, Take 1 tablet (500 mg) by mouth every other day., Disp: , Rfl:     metoprolol tartrate (Lopressor) 25 mg tablet, Take 0.5 tablets (12.5 mg) by mouth 2 times a day., Disp: , Rfl:     multivitamin tablet, Take 1 tablet by mouth once daily., Disp: , Rfl:     omega-3 (Fish OiL)  60- mg capsule, Take 1 capsule (500 mg) by mouth 2 times a day., Disp: 90 capsule, Rfl: 3    pantoprazole (ProtoNix) 40 mg EC tablet, Take 1 tablet (40 mg) by mouth once daily in the morning. Take before meals. Do not crush, chew, or split., Disp: , Rfl:     saccharomyces boulardii (Florastor) 250 mg capsule, Take 1 capsule (250 mg) by mouth once daily., Disp: , Rfl:     simvastatin (Zocor) 40 mg tablet, Take 1 tablet (40 mg) by mouth once daily at bedtime., Disp: , Rfl:     tiZANidine (Zanaflex) 4 mg tablet, Take 1 tablet (4 mg) by mouth once daily as needed., Disp: , Rfl:     triamcinolone (Kenalog) 0.1 % ointment, Apply 1 Application topically 2 times a day., Disp: , Rfl:     gabapentin (Neurontin) 300 mg capsule, TAKE ONE CAPSULE BY MOUTH THREE TIMES A DAY. MAY TAKE 1 ADDTIONAL CAPSULE BY MOUTH AS NEEDED., Disp: 90 capsule, Rfl: 3     Review of Systems   Constitutional: Negative.    HENT: Negative.     Eyes: Negative.    Respiratory: Negative.     Cardiovascular: Negative.    Gastrointestinal: Negative.    Endocrine: Negative.    Genitourinary: Negative.    Musculoskeletal:  Positive for arthralgias, myalgias and neck pain. Negative for gait problem, joint swelling and neck stiffness.   Skin: Negative.    Allergic/Immunologic: Negative.    Neurological: Negative.    Psychiatric/Behavioral: Negative.          Physical Exam  Vitals and nursing note reviewed.   HENT:      Head: Normocephalic.      Nose: Nose normal.   Eyes:      Extraocular Movements: Extraocular movements intact.      Conjunctiva/sclera: Conjunctivae normal.      Pupils: Pupils are equal, round, and reactive to light.   Cardiovascular:      Rate and Rhythm: Normal rate and regular rhythm.   Pulmonary:      Effort: Pulmonary effort is normal.      Breath sounds: Normal breath sounds.   Musculoskeletal:         General: Tenderness present. No swelling, deformity or signs of injury.      Cervical back: No rigidity or tenderness.      Lumbar  back: Tenderness present.      Right lower leg: No edema.      Left lower leg: No edema.      Comments: No neck rigidity.  Full range of motion but with discomfort with lateral flexion.  Positive for Spurling test bilaterally.  Positive for paraspinal tenderness at the cervical region.  With nonspecific radicular symptoms.  Positive for diffuse tenderness on palpation at the right shoulder joints including AC and GH joints.  Limited range of motion of the right arm due to the pain.  There is pain with elevation.  BUE 3-4/5, BLE 3-4/5.   Skin:     General: Skin is warm and dry.   Neurological:      General: No focal deficit present.      Mental Status: She is alert and oriented to person, place, and time.   Psychiatric:         Mood and Affect: Mood normal.         Behavior: Behavior normal.          Last Recorded Vitals  /72   Pulse 68   Temp 36.4 °C (97.5 °F) (Temporal)   Wt 103 kg (227 lb 6.5 oz)     Relevant Results      Pain Management Panel  More data exists         Latest Ref Rng & Units 10/27/2023 5/26/2023   Pain Management Panel   Amphetamine Screen, Urine Presumptive Negative Presumptive Negative  PRESUMPTIVE NEGATIVE    Barbiturate Screen, Urine Presumptive Negative Presumptive Negative  PRESUMPTIVE NEGATIVE    Benzodiazepines Screen, Urine Presumptive Negative Presumptive Negative  -   Fentanyl Screen, Urine Presumptive Negative Presumptive Negative  PRESUMPTIVE NEGATIVE    Methadone Screen, Urine NEGATIVE - PRESUMPTIVE NEGATIVE           Narrative & Impression   MRN: [id number]  Patient Name: ZACKERY CALVILLO     STUDY:  SHOULDER, CMPLT, MIN 2 VIEWS;  9/16/2022 2:40 pm     INDICATION:  Increasing right shoulder pain.  History of fall where she landed on  her right shoulder.  M25.519: Shoulder pain.     COMPARISON:  None.     ACCESSION NUMBER(S):  [id number]     ORDERING CLINICIAN:  THOMAS MCKEON     FINDINGS:  Right shoulder, four views     There is severe joint space narrowing with sclerosis  and  osteophytosis in the glenohumeral joint. Moderate narrowing  osteophytosis in the AC joint. There is no fracture. There is no  dislocation. There is diffuse osteopenia however     IMPRESSION:  Advanced glenohumeral joint osteoarthritis         Assessment/Plan   Problem List Items Addressed This Visit             ICD-10-CM    Cervical radiculitis M54.12    Relevant Medications    gabapentin (Neurontin) 300 mg capsule    Chronic right shoulder pain - Primary M25.511, G89.29    Relevant Medications    gabapentin (Neurontin) 300 mg capsule    Other Relevant Orders    FL fluoro images no charge    Arthrocentesis    Arthritis, shoulder region M19.019    Relevant Medications    gabapentin (Neurontin) 300 mg capsule    Other Relevant Orders    Arthrocentesis     Other Visit Diagnoses         Codes    Chronic pain of right knee     M25.561, G89.29    Relevant Medications    gabapentin (Neurontin) 300 mg capsule    Other Relevant Orders    XR knee right 4+ views    Medication management     Z79.899    Relevant Orders    Drug Screen, Urine With Reflex to Confirmation (Completed)                   1. Chronic right shoulder pain  - gabapentin (Neurontin) 300 mg capsule; TAKE ONE CAPSULE BY MOUTH THREE TIMES A DAY. MAY TAKE 1 ADDTIONAL CAPSULE BY MOUTH AS NEEDED.  Dispense: 90 capsule; Refill: 3  - FL fluoro images no charge; Future  - Arthrocentesis; Future    2. Arthritis, shoulder region  - gabapentin (Neurontin) 300 mg capsule; TAKE ONE CAPSULE BY MOUTH THREE TIMES A DAY. MAY TAKE 1 ADDTIONAL CAPSULE BY MOUTH AS NEEDED.  Dispense: 90 capsule; Refill: 3  - Arthrocentesis; Future    3. Cervical radiculitis  - gabapentin (Neurontin) 300 mg capsule; TAKE ONE CAPSULE BY MOUTH THREE TIMES A DAY. MAY TAKE 1 ADDTIONAL CAPSULE BY MOUTH AS NEEDED.  Dispense: 90 capsule; Refill: 3    4. Chronic pain of right knee  - gabapentin (Neurontin) 300 mg capsule; TAKE ONE CAPSULE BY MOUTH THREE TIMES A DAY. MAY TAKE 1 ADDTIONAL CAPSULE BY  MOUTH AS NEEDED.  Dispense: 90 capsule; Refill: 3  - XR knee right 4+ views    5. Medication management  - Drug Screen, Urine With Reflex to Confirmation; Future  - Drug Screen, Urine With Reflex to Confirmation       Plan/Follow-up Instructions:     Continue taking gabapentin 300 mg 3 times a day.    I ordered x-ray to your right knee and you may have it done anytime.    You are scheduled for right shoulder injection on 3/26/2024 in [place] with Dr. Cullen.  No aspirin this day.  The office will call you for further instructions.  You will then be seen for your postprocedure follow-up in 6 to 8 weeks.      Disclaimer: This note was created using voice recognition software. It was not corrected for typographical or grammatical errors, inadvertent word insertion, or any unintended errors. Please feel free to contact me for clarification.        Marlen Waite, HAM, APRN, FNP-C    [facility]  Office #: [phone]  Fax # [phone]

## 2024-03-26 ENCOUNTER — HOSPITAL ENCOUNTER (OUTPATIENT)
Dept: RADIOLOGY | Facility: HOSPITAL | Age: 79
Discharge: HOME | End: 2024-03-26
Payer: MEDICARE

## 2024-03-26 ENCOUNTER — HOSPITAL ENCOUNTER (OUTPATIENT)
Dept: PAIN MEDICINE | Facility: HOSPITAL | Age: 79
Discharge: HOME | End: 2024-03-26
Payer: MEDICARE

## 2024-03-26 VITALS
SYSTOLIC BLOOD PRESSURE: 143 MMHG | HEART RATE: 66 BPM | TEMPERATURE: 98.2 F | HEIGHT: 62 IN | DIASTOLIC BLOOD PRESSURE: 59 MMHG | WEIGHT: 224.87 LBS | OXYGEN SATURATION: 94 % | RESPIRATION RATE: 17 BRPM | BODY MASS INDEX: 41.38 KG/M2

## 2024-03-26 DIAGNOSIS — M19.019 ARTHRITIS, SHOULDER REGION: ICD-10-CM

## 2024-03-26 DIAGNOSIS — M25.511 CHRONIC RIGHT SHOULDER PAIN: ICD-10-CM

## 2024-03-26 DIAGNOSIS — G89.29 CHRONIC RIGHT SHOULDER PAIN: ICD-10-CM

## 2024-03-26 PROCEDURE — 20610 DRAIN/INJ JOINT/BURSA W/O US: CPT | Performed by: ANESTHESIOLOGY

## 2024-03-26 PROCEDURE — 2550000001 HC RX 255 CONTRASTS

## 2024-03-26 PROCEDURE — 77002 NEEDLE LOCALIZATION BY XRAY: CPT

## 2024-03-26 RX ADMIN — IOHEXOL 5 ML: 240 INJECTION, SOLUTION INTRATHECAL; INTRAVASCULAR; INTRAVENOUS; ORAL at 10:36

## 2024-03-26 ASSESSMENT — COLUMBIA-SUICIDE SEVERITY RATING SCALE - C-SSRS
6. HAVE YOU EVER DONE ANYTHING, STARTED TO DO ANYTHING, OR PREPARED TO DO ANYTHING TO END YOUR LIFE?: NO
2. HAVE YOU ACTUALLY HAD ANY THOUGHTS OF KILLING YOURSELF?: NO
1. IN THE PAST MONTH, HAVE YOU WISHED YOU WERE DEAD OR WISHED YOU COULD GO TO SLEEP AND NOT WAKE UP?: NO

## 2024-03-26 ASSESSMENT — PAIN - FUNCTIONAL ASSESSMENT
PAIN_FUNCTIONAL_ASSESSMENT: 0-10
PAIN_FUNCTIONAL_ASSESSMENT: 0-10

## 2024-03-26 ASSESSMENT — PAIN SCALES - GENERAL
PAINLEVEL_OUTOF10: 0 - NO PAIN
PAINLEVEL_OUTOF10: 4

## 2024-03-26 NOTE — DISCHARGE INSTRUCTIONS
Discharge Instructions:  Keep band-aid on for the next 24 hours.  No showering/bathing for the next 24 hours. You may notice soreness or increased pain in the area of your injection, which may continue for the first 48 hours.  Avoid driving or operating any heavy machinery until the next day after the procedure.  You should resume any medications and your regular diet after the procedure. Some of the side affects you may experience from the steroids are: Insomnia (inability to sleep), Increased sweating, Headaches, Increased fluid retention (swelling of your extremities), Increased appetite, Face flushing, If you are a diabetic, your blood sugars may go up.  Closely monitor your diet.  Your blood sugar should return to normal in a few days.  Complications: If the discomfort persists, apply moist heat to the area.  Serious complications are very uncommon but may include bleeding, infection or nerve damage. If sever pain, fever, redness or swelling near the injection site, have someone take you to the nearest emergency room to be evaluated for procedure complications or infection. Expectations: Local anesthetics wear off in several hours but duration of relief varies from individual to individual.  If you have any questions, please call the office at [phone].  If this is an emergency, please go to the nearest emergency room.

## 2024-03-26 NOTE — OP NOTE
Date: 3/26/2024  OR Location: GEN NON-OR PROCEDURES    Name: Sara Zavala, : 1945, Age: 78 y.o., MRN: [id number], Sex: female    Diagnosis   1. Chronic right shoulder pain  Arthrocentesis    Arthrocentesis      2. Arthritis, shoulder region  Arthrocentesis    Arthrocentesis       Preop diagnosis:  Severe degenerative joint disease right shoulder with arthralgia  Postop diagnosis: Same  Anesthesia: local  Complications: None  Procedure: right  Shoulder injection with fluoroscopic guidance   Patient is a 78 y.o.  year old  female who is here today for a right  shoulder injection. Patient has severe degenerative joint disease with chronic pain and has signed a consent prior to this procedure today. The patient was taken back to the operating room and placed in the supine position.  A  sterile prep and draping of the shoulder was done with Betadine and chloraprep × 3.  Using fluoroscopic guidance and an 8 inch Sterile ring  forcep as a pointer, I identified the lateral humeral head and glenohumeral joint and placed a total of 4 cc of 1% Xylocaine plain via 25-gauge 1-1/2 inch sterile needle for local anesthesia.  I then placed a sterile (1-1/2 inch) one and a half inch 22-gauge spinal needle at an oblique angle into the lateral aspect of the humeral head.  Negative aspiration was noted of blood or fluid. 3 cc of Omnipaque 240 milligrams was injected to outline the glenohumeral joint and the humeral head.  This was followed by injection of 40 mg Kenalog +4 cc of 2 % MPF xylocaine plain. The needle was then removed and a sterile bandage applied over the puncture site along with Neosporin ointment. The patient was taken to recovery room in awake stable condition with no neurologic deficit.  The patient was scheduled for a follow-up visit in 4-6 weeks.

## 2024-03-27 ENCOUNTER — DOCUMENTATION (OUTPATIENT)
Dept: PAIN MEDICINE | Facility: HOSPITAL | Age: 79
End: 2024-03-27
Payer: MEDICARE

## 2024-03-28 NOTE — ADDENDUM NOTE
Encounter addended by: Tiny Aguilera RN on: 3/28/2024 2:04 PM   Actions taken: Contacts section saved, Flowsheet accepted

## 2024-04-13 DIAGNOSIS — E78.1 HIGH TRIGLYCERIDES: ICD-10-CM

## 2024-04-13 DIAGNOSIS — F32.89 OTHER DEPRESSION: Primary | ICD-10-CM

## 2024-04-13 DIAGNOSIS — I10 ESSENTIAL HYPERTENSION: ICD-10-CM

## 2024-04-13 DIAGNOSIS — E03.9 ACQUIRED HYPOTHYROIDISM: ICD-10-CM

## 2024-04-15 RX ORDER — ESCITALOPRAM OXALATE 10 MG/1
10 TABLET ORAL DAILY
Qty: 90 TABLET | Refills: 3 | Status: SHIPPED | OUTPATIENT
Start: 2024-04-15

## 2024-04-15 RX ORDER — INDAPAMIDE 2.5 MG/1
2.5 TABLET ORAL
Qty: 90 TABLET | Refills: 3 | Status: SHIPPED | OUTPATIENT
Start: 2024-04-15

## 2024-04-15 RX ORDER — LEVOTHYROXINE SODIUM 50 UG/1
50 TABLET ORAL
Qty: 90 TABLET | Refills: 3 | Status: SHIPPED | OUTPATIENT
Start: 2024-04-15

## 2024-04-15 RX ORDER — SIMVASTATIN 40 MG/1
40 TABLET, FILM COATED ORAL EVERY EVENING
Qty: 90 TABLET | Refills: 3 | Status: SHIPPED | OUTPATIENT
Start: 2024-04-15

## 2024-04-19 ENCOUNTER — TELEPHONE (OUTPATIENT)
Dept: PRIMARY CARE | Facility: CLINIC | Age: 79
End: 2024-04-19
Payer: MEDICARE

## 2024-04-19 NOTE — TELEPHONE ENCOUNTER
4/22/24 [facility] visit with Joi Breen NP confirmed via phone with Rogelio Buchanan/ patient's brother.

## 2024-04-22 ENCOUNTER — OFFICE VISIT (OUTPATIENT)
Dept: PRIMARY CARE | Facility: CLINIC | Age: 79
End: 2024-04-22
Payer: MEDICARE

## 2024-04-22 ENCOUNTER — LAB (OUTPATIENT)
Dept: LAB | Facility: LAB | Age: 79
End: 2024-04-22
Payer: MEDICARE

## 2024-04-22 VITALS
SYSTOLIC BLOOD PRESSURE: 128 MMHG | OXYGEN SATURATION: 96 % | BODY MASS INDEX: 41.22 KG/M2 | DIASTOLIC BLOOD PRESSURE: 76 MMHG | TEMPERATURE: 97.9 F | HEART RATE: 60 BPM | WEIGHT: 224 LBS | RESPIRATION RATE: 16 BRPM | HEIGHT: 62 IN

## 2024-04-22 DIAGNOSIS — E11.319 DIABETIC RETINOPATHY ASSOCIATED WITH CONTROLLED TYPE 2 DIABETES MELLITUS (MULTI): ICD-10-CM

## 2024-04-22 DIAGNOSIS — I10 ESSENTIAL HYPERTENSION: ICD-10-CM

## 2024-04-22 DIAGNOSIS — K21.9 GASTROESOPHAGEAL REFLUX DISEASE WITHOUT ESOPHAGITIS: ICD-10-CM

## 2024-04-22 DIAGNOSIS — E78.1 HIGH TRIGLYCERIDES: Primary | ICD-10-CM

## 2024-04-22 DIAGNOSIS — E03.9 ACQUIRED HYPOTHYROIDISM: ICD-10-CM

## 2024-04-22 DIAGNOSIS — G62.9 POLYNEUROPATHY: ICD-10-CM

## 2024-04-22 DIAGNOSIS — E78.1 HIGH TRIGLYCERIDES: ICD-10-CM

## 2024-04-22 LAB
ALBUMIN SERPL BCP-MCNC: 3.8 G/DL (ref 3.4–5)
ALP SERPL-CCNC: 48 U/L (ref 33–136)
ALT SERPL W P-5'-P-CCNC: 18 U/L (ref 7–45)
ANION GAP SERPL CALC-SCNC: 11 MMOL/L (ref 10–20)
AST SERPL W P-5'-P-CCNC: 21 U/L (ref 9–39)
BILIRUB SERPL-MCNC: 0.5 MG/DL (ref 0–1.2)
BUN SERPL-MCNC: 22 MG/DL (ref 6–23)
CALCIUM SERPL-MCNC: 9.1 MG/DL (ref 8.6–10.3)
CHLORIDE SERPL-SCNC: 100 MMOL/L (ref 98–107)
CHOLEST SERPL-MCNC: 132 MG/DL (ref 0–199)
CHOLESTEROL/HDL RATIO: 3.6
CO2 SERPL-SCNC: 32 MMOL/L (ref 21–32)
CREAT SERPL-MCNC: 0.89 MG/DL (ref 0.5–1.05)
EGFRCR SERPLBLD CKD-EPI 2021: 66 ML/MIN/1.73M*2
ERYTHROCYTE [DISTWIDTH] IN BLOOD BY AUTOMATED COUNT: 12.9 % (ref 11.5–14.5)
EST. AVERAGE GLUCOSE BLD GHB EST-MCNC: 120 MG/DL
GLUCOSE SERPL-MCNC: 105 MG/DL (ref 74–99)
HBA1C MFR BLD: 5.8 %
HCT VFR BLD AUTO: 41 % (ref 36–46)
HDLC SERPL-MCNC: 37.1 MG/DL
HGB BLD-MCNC: 13.4 G/DL (ref 12–16)
LDLC SERPL CALC-MCNC: 60 MG/DL
MCH RBC QN AUTO: 30.6 PG (ref 26–34)
MCHC RBC AUTO-ENTMCNC: 32.7 G/DL (ref 32–36)
MCV RBC AUTO: 94 FL (ref 80–100)
NON HDL CHOLESTEROL: 95 MG/DL (ref 0–149)
NRBC BLD-RTO: 0 /100 WBCS (ref 0–0)
PLATELET # BLD AUTO: 228 X10*3/UL (ref 150–450)
POTASSIUM SERPL-SCNC: 4 MMOL/L (ref 3.5–5.3)
PROT SERPL-MCNC: 5.7 G/DL (ref 6.4–8.2)
RBC # BLD AUTO: 4.38 X10*6/UL (ref 4–5.2)
SODIUM SERPL-SCNC: 139 MMOL/L (ref 136–145)
TRIGL SERPL-MCNC: 177 MG/DL (ref 0–149)
TSH SERPL-ACNC: 2.17 MIU/L (ref 0.44–3.98)
VLDL: 35 MG/DL (ref 0–40)
WBC # BLD AUTO: 6.9 X10*3/UL (ref 4.4–11.3)

## 2024-04-22 PROCEDURE — 1126F AMNT PAIN NOTED NONE PRSNT: CPT | Performed by: NURSE PRACTITIONER

## 2024-04-22 PROCEDURE — 1160F RVW MEDS BY RX/DR IN RCRD: CPT | Performed by: NURSE PRACTITIONER

## 2024-04-22 PROCEDURE — 36415 COLL VENOUS BLD VENIPUNCTURE: CPT

## 2024-04-22 PROCEDURE — 3078F DIAST BP <80 MM HG: CPT | Performed by: NURSE PRACTITIONER

## 2024-04-22 PROCEDURE — 83036 HEMOGLOBIN GLYCOSYLATED A1C: CPT

## 2024-04-22 PROCEDURE — 36415 COLL VENOUS BLD VENIPUNCTURE: CPT | Performed by: NURSE PRACTITIONER

## 2024-04-22 PROCEDURE — 99349 HOME/RES VST EST MOD MDM 40: CPT | Performed by: NURSE PRACTITIONER

## 2024-04-22 PROCEDURE — 1159F MED LIST DOCD IN RCRD: CPT | Performed by: NURSE PRACTITIONER

## 2024-04-22 PROCEDURE — 3074F SYST BP LT 130 MM HG: CPT | Performed by: NURSE PRACTITIONER

## 2024-04-22 ASSESSMENT — PAIN SCALES - GENERAL: PAINLEVEL: 0-NO PAIN

## 2024-04-22 NOTE — PROGRESS NOTES
Subjective   Patient ID: Sara Zavala is a 78 y.o. female who presents for Follow-up (Routine 3 month follow up, labs).    Visit for 79 y/o female seen today in private home, alone for routine follow up of chronic medical conditions. Patient is sitting on recliner this morning. She is alert, oriented, answering all questions without difficulty. She lives in a single story home with her brother. Patient has a vehicle but her brother does most of the driving as she needs dropped off at the door for any medical appointments due to limited mobility, chronic back pain. Pt is ambulatory short distances.  She uses a cane in the house but will use a walker if she goes out for a medical appointment. She denies recent fall or injury. Patient is able to manage her own medications. She is able to do her own dressing and toileting but requires assistance with meal preparation. She denies appetite changes, signs of weight loss. She denies abdominal pain, nausea, vomiting. She endorses chronic constipation and takes stool softeners with improvement. She denies any bladder concerns.      Chronic pain- remains active with Dr. Minaya, pain management. She endorses chronic pain, mostly in lower back, right shoulder and bilateral knees. She takes Gabapentin and uses Voltaren gel with improvement.      HTN- remains active with cardiologist Dr. Young. She has a follow up scheduled on 5/7/24. She denies any headaches, chest pain, palpitations, increased shortness of breath, edema or signs of weight gain. Patient has a home BP Cuff and will occasionally monitor her BP but she does not do this routinely.      DM- patient monitors her glucose levels daily. She continues on Trulicity weekly. She denies any hypoglycemic events. BG values range from . Her podiatrist is Dr. Zee and she is scheduled to have her toenails trimmed this Thursday. She follows with opththamologist Dr Watts annually.      Home Visit:          Medically  necessary due to: Illness or condition that results in activity lmitation or restriction that impacts the ability to leave home such as:, unsteady gait/poor balance         Current Outpatient Medications:     aspirin 81 mg EC tablet, Take 1 tablet (81 mg) by mouth once daily., Disp: , Rfl:     calcium citrate/vitamin D3 (CALCIUM CITRATE + D ORAL), as directed Orally, Disp: , Rfl:     clobetasol (Temovate) 0.05 % ointment, Apply 1 Application topically 2 times a day., Disp: , Rfl:     clotrimazole (Lotrimin) 1 % cream, APPLY 1 APPLICATION EXTERNALLY TWICE A DAY, Disp: , Rfl:     cranberry extract 425 mg capsule, as directed Orally, Disp: , Rfl:     cyanocobalamin (Vitamin B-12) 1,000 mcg tablet, Take 1 tablet (1,000 mcg) by mouth., Disp: , Rfl:     diclofenac sodium (Voltaren) 1 % gel gel, APPLY SPARINGLY TO THE AFFECTED AREA 2 TO 3 TIMES A DAY AS NEEDED FOR PAIN., Disp: , Rfl:     diphenoxylate-atropine (Lomotil) 2.5-0.025 mg tablet, Take 1 tablet by mouth 4 times a day as needed., Disp: , Rfl:     docusate sodium (Colace) 100 mg capsule, Take 1 capsule (100 mg) by mouth once daily as needed., Disp: , Rfl:     dulaglutide (Trulicity) 0.75 mg/0.5 mL pen injector, INJECT 1 PEN-INJECTOR UNDER THE SKIN ONCE EVERY WEEK, Disp: 6 mL, Rfl: 3    escitalopram (Lexapro) 10 mg tablet, Take 1 tablet by mouth once daily., Disp: 90 tablet, Rfl: 3    gabapentin (Neurontin) 300 mg capsule, TAKE ONE CAPSULE BY MOUTH THREE TIMES A DAY. MAY TAKE 1 ADDTIONAL CAPSULE BY MOUTH AS NEEDED., Disp: 90 capsule, Rfl: 3    indapamide (Lozol) 2.5 mg tablet, Take 1 tablet by mouth once daily in the morning., Disp: 90 tablet, Rfl: 3    levothyroxine (Synthroid, Levoxyl) 50 mcg tablet, Take 1 tablet by mouth once daily in the morning on an empty stomach., Disp: 90 tablet, Rfl: 3    metFORMIN (Glucophage) 500 mg tablet, Take 1 tablet (500 mg) by mouth every other day., Disp: , Rfl:     metoprolol tartrate (Lopressor) 25 mg tablet, Take 0.5 tablets  "(12.5 mg) by mouth 2 times a day., Disp: , Rfl:     multivitamin tablet, Take 1 tablet by mouth once daily., Disp: , Rfl:     omega-3 (Fish OiL) 60- mg capsule, Take 1 capsule (500 mg) by mouth 2 times a day., Disp: 90 capsule, Rfl: 3    pantoprazole (ProtoNix) 40 mg EC tablet, Take 1 tablet (40 mg) by mouth once daily in the morning. Take before meals. Do not crush, chew, or split., Disp: , Rfl:     saccharomyces boulardii (Florastor) 250 mg capsule, Take 1 capsule (250 mg) by mouth once daily., Disp: , Rfl:     simvastatin (Zocor) 40 mg tablet, Take 1 tablet by mouth once daily in the evening., Disp: 90 tablet, Rfl: 3    tiZANidine (Zanaflex) 4 mg tablet, Take 1 tablet (4 mg) by mouth once daily as needed., Disp: , Rfl:     triamcinolone (Kenalog) 0.1 % ointment, Apply 1 Application topically 2 times a day., Disp: , Rfl:      Review of Systems  Constitutional:  Negative for appetite change, chills, fever and unexpected weight change.   HENT:  Negative for trouble swallowing.    Respiratory:  Positive for shortness of breath on exertion. Negative for wheezing.    Cardiovascular:  Negative for chest pain, palpitations and leg swelling.   Gastrointestinal: Positive for constipation. Negative for abdominal pain, nausea and vomiting.   Endocrine: Positive for diabetes, thyroid disease  Genitourinary:  Negative for difficulty urinating.   Musculoskeletal:  Positive for arthralgias, unsteady gait. Chronic lower back, right shoulder, bilateral knee pain  Neurological:  Negative for dizziness and light-headedness.   Psychiatric/Behavioral: Positive for depression     Objective   /76 (BP Location: Left arm, Patient Position: Sitting, BP Cuff Size: Adult)   Pulse 60   Temp 36.6 °C (97.9 °F) (Temporal)   Resp 16   Ht 1.575 m (5' 2\")   Wt 102 kg (224 lb)   SpO2 96%   BMI 40.97 kg/m²     Physical Exam  Constitutional:       General: She is not in acute distress.     Appearance: Normal appearance. She is " overweight      Comments: Sitting in recliner, legs dependent    HENT:      Head: Normocephalic and atraumatic.      Nose: Nose normal.      Mouth/Throat:      Mouth: Mucous membranes are moist.      Pharynx: Oropharynx is clear.   Eyes:      Extraocular Movements: Extraocular movements intact.      Pupils: Pupils are equal, round, and reactive to light.      Comments: wearing glasses   Cardiovascular:      Rate and Rhythm: Normal rate and regular rhythm.      Heart sounds: Normal heart sounds. No murmur heard.     No friction rub. No gallop.   Pulmonary:      Effort: Pulmonary effort is normal. No respiratory distress.      Breath sounds: Normal breath sounds. No wheezing, rhonchi or rales.   Abdominal:      General: Bowel sounds are normal. There is no distension.      Palpations: Abdomen is soft.      Comments: hernia present    Musculoskeletal:         General: modest deformity of bilateral knees      Cervical back: Neck supple.      Right lower leg: No edema.      Left lower leg: No edema.   Skin:     General: Skin is warm and dry.   Neurological:      General: No focal deficit present.      Mental Status: She is alert and oriented to person, place, and time.      Motor: generalized weakness      Gait: Unsteady    Psychiatric:         Mood and Affect: Mood normal.         Behavior: Behavior normal.     Assessment/Plan   Diagnoses and all orders for this visit:  High triglycerides  -     Lipid panel; Future  -chronic, managed with fish oil   Essential hypertension  -     CBC; Future  -     Comprehensive metabolic panel; Future  -chronic, vitals stable  -continue aspirin, metoprolol  Gastroesophageal reflux disease without esophagitis  -chronic, stable  -no current GI concerns  -continue pantoprazole, probiotic   Diabetic retinopathy associated with controlled type 2 diabetes mellitus (Multi)  -     Hemoglobin A1c; Future  -chronic, managed with Trulicity and diet control   Acquired hypothyroidism  -     Tsh With  Reflex To Free T4 If Abnormal; Future  -chronic, continue Levothyroxine   Polyneuropathy  -chronic, managed Gabapentin, Voltaren gel and Tizanidine PRN    Routine labs obtained in home- CBC, CMP, Lipid panel, A1c, TSH level- pt tolerated well        Joi Breen, LUDIVINA-CNP

## 2024-05-24 ENCOUNTER — APPOINTMENT (OUTPATIENT)
Dept: PAIN MEDICINE | Facility: HOSPITAL | Age: 79
End: 2024-05-24
Payer: MEDICARE

## 2024-05-31 ENCOUNTER — OFFICE VISIT (OUTPATIENT)
Dept: PAIN MEDICINE | Facility: HOSPITAL | Age: 79
End: 2024-05-31
Payer: MEDICARE

## 2024-05-31 VITALS
BODY MASS INDEX: 43 KG/M2 | TEMPERATURE: 98.4 F | SYSTOLIC BLOOD PRESSURE: 126 MMHG | RESPIRATION RATE: 14 BRPM | HEART RATE: 75 BPM | DIASTOLIC BLOOD PRESSURE: 80 MMHG | HEIGHT: 62 IN | WEIGHT: 233.69 LBS

## 2024-05-31 DIAGNOSIS — M19.019 ARTHRITIS, SHOULDER REGION: ICD-10-CM

## 2024-05-31 DIAGNOSIS — M25.511 CHRONIC RIGHT SHOULDER PAIN: Primary | ICD-10-CM

## 2024-05-31 DIAGNOSIS — G89.29 CHRONIC RIGHT SHOULDER PAIN: Primary | ICD-10-CM

## 2024-05-31 DIAGNOSIS — M46.1 SACROILIITIS (CMS-HCC): ICD-10-CM

## 2024-05-31 PROCEDURE — 99214 OFFICE O/P EST MOD 30 MIN: CPT | Performed by: NURSE PRACTITIONER

## 2024-05-31 RX ORDER — GABAPENTIN 300 MG/1
CAPSULE ORAL
Qty: 270 CAPSULE | Refills: 1 | Status: SHIPPED | OUTPATIENT
Start: 2024-05-31

## 2024-05-31 ASSESSMENT — ENCOUNTER SYMPTOMS
MYALGIAS: 1
BACK PAIN: 1
ENDOCRINE NEGATIVE: 1
NECK STIFFNESS: 0
EYES NEGATIVE: 1
RESPIRATORY NEGATIVE: 1
CARDIOVASCULAR NEGATIVE: 1
NEUROLOGICAL NEGATIVE: 1
JOINT SWELLING: 0
PSYCHIATRIC NEGATIVE: 1
NECK PAIN: 0
ARTHRALGIAS: 1
GASTROINTESTINAL NEGATIVE: 1
ALLERGIC/IMMUNOLOGIC NEGATIVE: 1
CONSTITUTIONAL NEGATIVE: 1

## 2024-05-31 ASSESSMENT — PAIN SCALES - GENERAL: PAINLEVEL: 7

## 2024-05-31 NOTE — H&P (VIEW-ONLY)
Subjective   Patient ID: Sara Zavala is a 79 y.o. female who presents for Follow-up (Post procedure follow up).    Sara Guevara is a pleasant 79-year-old  female who is here for postprocedure follow-up.  Patient presents in a wheelchair.  She ambulates in short distance.  Patient arrives in the room by herself.  She is accompanied by her brother who was seen earlier.    Patient had right shoulder injection done on 3/26/2024.  The injection has improved her arm pain and muscle spasm.  It provided 80% relief however it did not last long.  The injection lasted for more than a month.  The injection has improved her arm pain, sleep and ability to participate in her daily activities.    Patient continues to have right shoulder pain.  She rates her pain at 7 out of 10.  Pain is constant.  She describes it as aching, cramping, shooting and stabbing kind of pain.  She reports that right shoulder pain came back when she fell on 5/22/2024.  She had to call the paramedic to help her get up.  She did not seek emergent care.  She mentioned that the EMT held her arms up causing pain to her right shoulder.  She reported was hurting since then.  She denies arm weakness, weakness in  or dropping objects.  She denies leg weakness or change in balance.  She denies bowel or bladder incontinence.    Patient also has pain to her tailbone that happened during the fall.  Patient declined coccyx x-ray when suggested.  She reports it will get better and does not think she needs it treated.    Patient continues to take gabapentin 300 mg 3 times a day.  She takes Tylenol on occasion for pain relief.  She denies side effects to her medications.    I discussed the plan of care including pharmacologic and joint interventional procedure.  Patient would like right shoulder injection be repeated.  This is an intra-articular injection done under fluoroscopy.  Questions were answered during this  encounter.      ------------  PROCEDURES:    3/26/2024: Right shoulder injection  8/29/2023: Right shoulder injection  6/27/2023: Right shoulder injection  12/6/2022: Cervical DEE #1.  No relief  -------------          OAR[facility]:  Marlen Waite, APRN-CNP, DNP on 5/31/2024  9:52 AM  I have personally reviewed the OARRS report for Sara Zavala. I have considered the risks of abuse, dependence, addiction and diversion    Past Medical History  She has a past medical history of Abdominal hernia, Anemia, iron deficiency, Anxiety disorder, Atherosclerotic heart disease of native coronary artery without angina pectoris, Chronic back pain, Diabetes (Multi), Diabetic neuropathy (Multi), Diabetic retinopathy (Multi), Enlarged uterus, Hyperlipidemia, Hypertension, Hypothyroidism, Left ventricular ejection fraction greater than 40% (07/22/2019), Low back pain, Morbid obesity (Multi), Other specified diabetes mellitus without complications (Multi), Peptic ulcer disease, Personal history of other diseases of the circulatory system, Psoriasis, Vitamin B12 deficiency, and Vitamin D deficiency.    Surgical History  Past Surgical History:   Procedure Laterality Date    APPENDECTOMY  2005    CARDIAC CATHETERIZATION  05/03/2017    COLONOSCOPY  08/04/2010    COLONOSCOPY  07/31/2015    EPIDURAL BLOCK INJECTION  09/21/2010    EPIDURAL BLOCK INJECTION  10/2013    Dr. Cote    OTHER SURGICAL HISTORY      Carpal tunnel surgery    OTHER SURGICAL HISTORY      Phacoemulsification of cataract and insertion of intraocular lens    OTHER SURGICAL HISTORY  2004    Bariatric surgery - gastric bypass    OTHER SURGICAL HISTORY  1961    Incision and drainage of pilonidal cyst, also 1962    OTHER SURGICAL HISTORY  10/26/2019    Pacemaker insertion - carmen Florez    OTHER SURGICAL HISTORY  2005    Cholecystectomy laparoscopic    PILONIDAL CYST DRAINAGE  1961    PILONIDAL CYST DRAINAGE  1962    SINUS SURGERY  10/2013    sack of bacteria from  left sinus (removed)    TOE SURGERY  1985    4 toe nails removed    UMBILICAL HERNIA REPAIR  1986        Social History  She reports that she has never smoked. She has never been exposed to tobacco smoke. She has never used smokeless tobacco. She reports that she does not currently use alcohol. She reports that she does not use drugs.    Family History  Family History   Problem Relation Name Age of Onset    Diabetes Mother      Heart disease Mother      Hypertension Mother      Heart disease Father      Stroke Father      Hypertension Sister      Mental illness Brother      Diabetes Brother      Hypertension Brother          Allergies  Ace inhibitors and Sulfamethoxazole-trimethoprim      Current Outpatient Medications:     aspirin 81 mg EC tablet, Take 1 tablet (81 mg) by mouth once daily., Disp: , Rfl:     calcium citrate/vitamin D3 (CALCIUM CITRATE + D ORAL), as directed Orally, Disp: , Rfl:     clobetasol (Temovate) 0.05 % ointment, Apply 1 Application topically 2 times a day., Disp: , Rfl:     clotrimazole (Lotrimin) 1 % cream, APPLY 1 APPLICATION EXTERNALLY TWICE A DAY, Disp: , Rfl:     cranberry extract 425 mg capsule, as directed Orally, Disp: , Rfl:     cyanocobalamin (Vitamin B-12) 1,000 mcg tablet, Take 1 tablet (1,000 mcg) by mouth., Disp: , Rfl:     diclofenac sodium (Voltaren) 1 % gel gel, APPLY SPARINGLY TO THE AFFECTED AREA 2 TO 3 TIMES A DAY AS NEEDED FOR PAIN., Disp: , Rfl:     diphenoxylate-atropine (Lomotil) 2.5-0.025 mg tablet, Take 1 tablet by mouth 4 times a day as needed., Disp: , Rfl:     docusate sodium (Colace) 100 mg capsule, Take 1 capsule (100 mg) by mouth once daily as needed., Disp: , Rfl:     dulaglutide (Trulicity) 0.75 mg/0.5 mL pen injector, INJECT 1 PEN-INJECTOR UNDER THE SKIN ONCE EVERY WEEK, Disp: 6 mL, Rfl: 3    escitalopram (Lexapro) 10 mg tablet, Take 1 tablet by mouth once daily., Disp: 90 tablet, Rfl: 3    indapamide (Lozol) 2.5 mg tablet, Take 1 tablet by mouth once daily  in the morning., Disp: 90 tablet, Rfl: 3    levothyroxine (Synthroid, Levoxyl) 50 mcg tablet, Take 1 tablet by mouth once daily in the morning on an empty stomach., Disp: 90 tablet, Rfl: 3    metoprolol tartrate (Lopressor) 25 mg tablet, Take 0.5 tablets (12.5 mg) by mouth 2 times a day., Disp: , Rfl:     multivitamin tablet, Take 1 tablet by mouth once daily., Disp: , Rfl:     omega-3 (Fish OiL) 60- mg capsule, Take 1 capsule (500 mg) by mouth 2 times a day., Disp: 90 capsule, Rfl: 3    pantoprazole (ProtoNix) 40 mg EC tablet, Take 1 tablet (40 mg) by mouth once daily in the morning. Take before meals. Do not crush, chew, or split., Disp: , Rfl:     saccharomyces boulardii (Florastor) 250 mg capsule, Take 1 capsule (250 mg) by mouth once daily., Disp: , Rfl:     simvastatin (Zocor) 40 mg tablet, Take 1 tablet by mouth once daily in the evening., Disp: 90 tablet, Rfl: 3    tiZANidine (Zanaflex) 4 mg tablet, Take 1 tablet (4 mg) by mouth once daily as needed., Disp: , Rfl:     triamcinolone (Kenalog) 0.1 % ointment, Apply 1 Application topically 2 times a day., Disp: , Rfl:     gabapentin (Neurontin) 300 mg capsule, TAKE ONE CAPSULE BY MOUTH THREE TIMES A DAY. MAY TAKE 1 ADDTIONAL CAPSULE BY MOUTH AS NEEDED., Disp: 270 capsule, Rfl: 1     Review of Systems   Constitutional: Negative.    HENT: Negative.     Eyes: Negative.    Respiratory: Negative.     Cardiovascular: Negative.    Gastrointestinal: Negative.    Endocrine: Negative.    Genitourinary: Negative.    Musculoskeletal:  Positive for arthralgias, back pain and myalgias. Negative for gait problem, joint swelling, neck pain and neck stiffness.   Skin: Negative.    Allergic/Immunologic: Negative.    Neurological: Negative.    Psychiatric/Behavioral: Negative.          Physical Exam  Vitals and nursing note reviewed.   HENT:      Head: Normocephalic.      Nose: Nose normal.   Eyes:      Extraocular Movements: Extraocular movements intact.       Conjunctiva/sclera: Conjunctivae normal.      Pupils: Pupils are equal, round, and reactive to light.   Cardiovascular:      Rate and Rhythm: Normal rate and regular rhythm.   Pulmonary:      Effort: Pulmonary effort is normal.      Breath sounds: Normal breath sounds.   Musculoskeletal:         General: Tenderness present. No swelling, deformity or signs of injury.      Cervical back: No rigidity or tenderness.      Lumbar back: Tenderness present.      Right lower leg: No edema.      Left lower leg: No edema.      Comments: No neck rigidity.  Full range of motion but with discomfort with lateral flexion.  Positive for Spurling test bilaterally.  Positive for paraspinal tenderness at the cervical region with nonspecific radicular symptoms.  Positive for diffuse tenderness on palpation at the right shoulder joints including AC, GH and biceps muscle.  Full range of motion of arms/shoulders but with discomfort.  Positive for tenderness on palpation at the SI joints and coccyx region.  Positive right leg raise eliciting back pain.  BUE 3-4/5, BLE 3-4/5.   Skin:     General: Skin is warm and dry.   Neurological:      General: No focal deficit present.      Mental Status: She is alert and oriented to person, place, and time.   Psychiatric:         Mood and Affect: Mood normal.         Behavior: Behavior normal.          Last Recorded Vitals  /80   Pulse 75   Temp 36.9 °C (98.4 °F) (Temporal)   Resp 14   Wt 106 kg (233 lb 11 oz)     Relevant Results      Pain Management Panel  More data exists         Latest Ref Rng & Units 3/8/2024 10/27/2023   Pain Management Panel   Amphetamine Screen, Urine Presumptive Negative Presumptive Negative  Presumptive Negative    Barbiturate Screen, Urine Presumptive Negative Presumptive Negative  Presumptive Negative    Benzodiazepines Screen, Urine Presumptive Negative Presumptive Negative  Presumptive Negative    Fentanyl Screen, Urine Presumptive Negative Presumptive Negative   Presumptive Negative    Methadone Screen, Urine Presumptive Negative Presumptive Negative  -        --------  Narrative & Impression   MRN: [id number]  Patient Name: ZACKERY CALVILLO     STUDY:  SHOULDER, CMPLT, MIN 2 VIEWS;  9/16/2022 2:40 pm     INDICATION:  Increasing right shoulder pain.  History of fall where she landed on  her right shoulder.  M25.519: Shoulder pain.     COMPARISON:  None.     ACCESSION NUMBER(S):  [id number]     ORDERING CLINICIAN:  THOMAS MCKEON     FINDINGS:  Right shoulder, four views     There is severe joint space narrowing with sclerosis and  osteophytosis in the glenohumeral joint. Moderate narrowing  osteophytosis in the AC joint. There is no fracture. There is no  dislocation. There is diffuse osteopenia however     IMPRESSION:  Advanced glenohumeral joint osteoarthritis   --------      Assessment/Plan   Problem List Items Addressed This Visit             ICD-10-CM    Chronic right shoulder pain - Primary M25.511, G89.29    Relevant Medications    gabapentin (Neurontin) 300 mg capsule    Other Relevant Orders    FL fluoro images no charge    Arthrocentesis    Arthritis, shoulder region M19.019    Relevant Medications    gabapentin (Neurontin) 300 mg capsule    Other Relevant Orders    FL fluoro images no charge    Arthrocentesis     Other Visit Diagnoses         Codes    Sacroiliitis (CMS-[facility])     M46.1    Relevant Medications    gabapentin (Neurontin) 300 mg capsule                   1. Chronic right shoulder pain  - gabapentin (Neurontin) 300 mg capsule; TAKE ONE CAPSULE BY MOUTH THREE TIMES A DAY. MAY TAKE 1 ADDTIONAL CAPSULE BY MOUTH AS NEEDED.  Dispense: 270 capsule; Refill: 1  - FL fluoro images no charge; Future  - Arthrocentesis; Future    2. Arthritis, shoulder region  - gabapentin (Neurontin) 300 mg capsule; TAKE ONE CAPSULE BY MOUTH THREE TIMES A DAY. MAY TAKE 1 ADDTIONAL CAPSULE BY MOUTH AS NEEDED.  Dispense: 270 capsule; Refill: 1  - FL fluoro images no charge; Future  -  Arthrocentesis; Future    3. Sacroiliitis (CMS-HCC)  - gabapentin (Neurontin) 300 mg capsule; TAKE ONE CAPSULE BY MOUTH THREE TIMES A DAY. MAY TAKE 1 ADDTIONAL CAPSULE BY MOUTH AS NEEDED.  Dispense: 270 capsule; Refill: 1       Plan/Follow-up Instructions:     Continue taking gabapentin 300 mg 3 times a day.    ---------------    Your next appointment is with Dr. Cullen in:    [facility]    For pain block/injection on: 6/14/2024, right shoulder intra-articular injection    LOCAL    No aspirin this day    °You will receive a phone call the weekday before your appointment/procedure.   °Please KEEP your scheduled appointments.     °BRING your photo ID, insurance information, and a correct list of your home medications to EVERY visit.    °Please call our office at [phone] if you have any questions.     You will then be seen for a postprocedure follow-up in 8 to 12 weeks    ---------------        Disclaimer: This note was created using voice recognition software. It was not corrected for typographical or grammatical errors, inadvertent word insertion, or any unintended errors. Please feel free to contact me for clarification.      Time     Prep time on date of the patient encounter: 2  Time spent directly with patient/family/caregiver: 30   Documentation time: 2  Total time on date of patient encounter: 34        Marlen Waite DNP, APRN, FNP-C    [facility]  Office #: [phone]  Fax # [phone]

## 2024-05-31 NOTE — PROGRESS NOTES
Subjective   Patient ID: Sara Zavala is a 79 y.o. female who presents for Follow-up (Post procedure follow up).    Sara Guevara is a pleasant 79-year-old  female who is here for postprocedure follow-up.  Patient presents in a wheelchair.  She ambulates in short distance.  Patient arrives in the room by herself.  She is accompanied by her brother who was seen earlier.    Patient had right shoulder injection done on 3/26/2024.  The injection has improved her arm pain and muscle spasm.  It provided 80% relief however it did not last long.  The injection lasted for more than a month.  The injection has improved her arm pain, sleep and ability to participate in her daily activities.    Patient continues to have right shoulder pain.  She rates her pain at 7 out of 10.  Pain is constant.  She describes it as aching, cramping, shooting and stabbing kind of pain.  She reports that right shoulder pain came back when she fell on 5/22/2024.  She had to call the paramedic to help her get up.  She did not seek emergent care.  She mentioned that the EMT held her arms up causing pain to her right shoulder.  She reported was hurting since then.  She denies arm weakness, weakness in  or dropping objects.  She denies leg weakness or change in balance.  She denies bowel or bladder incontinence.    Patient also has pain to her tailbone that happened during the fall.  Patient declined coccyx x-ray when suggested.  She reports it will get better and does not think she needs it treated.    Patient continues to take gabapentin 300 mg 3 times a day.  She takes Tylenol on occasion for pain relief.  She denies side effects to her medications.    I discussed the plan of care including pharmacologic and joint interventional procedure.  Patient would like right shoulder injection be repeated.  This is an intra-articular injection done under fluoroscopy.  Questions were answered during this  encounter.      ------------  PROCEDURES:    3/26/2024: Right shoulder injection  8/29/2023: Right shoulder injection  6/27/2023: Right shoulder injection  12/6/2022: Cervical DEE #1.  No relief  -------------          OARRS:  Marlen Waite, APRN-CNP, DNP on 5/31/2024  9:52 AM  I have personally reviewed the OARRS report for Sara Zavala. I have considered the risks of abuse, dependence, addiction and diversion    Past Medical History  She has a past medical history of Abdominal hernia, Anemia, iron deficiency, Anxiety disorder, Atherosclerotic heart disease of native coronary artery without angina pectoris, Chronic back pain, Diabetes (Multi), Diabetic neuropathy (Multi), Diabetic retinopathy (Multi), Enlarged uterus, Hyperlipidemia, Hypertension, Hypothyroidism, Left ventricular ejection fraction greater than 40% (07/22/2019), Low back pain, Morbid obesity (Multi), Other specified diabetes mellitus without complications (Multi), Peptic ulcer disease, Personal history of other diseases of the circulatory system, Psoriasis, Vitamin B12 deficiency, and Vitamin D deficiency.    Surgical History  Past Surgical History:   Procedure Laterality Date    APPENDECTOMY  2005    CARDIAC CATHETERIZATION  05/03/2017    COLONOSCOPY  08/04/2010    COLONOSCOPY  07/31/2015    EPIDURAL BLOCK INJECTION  09/21/2010    EPIDURAL BLOCK INJECTION  10/2013    Dr. Cote    OTHER SURGICAL HISTORY      Carpal tunnel surgery    OTHER SURGICAL HISTORY      Phacoemulsification of cataract and insertion of intraocular lens    OTHER SURGICAL HISTORY  2004    Bariatric surgery - gastric bypass    OTHER SURGICAL HISTORY  1961    Incision and drainage of pilonidal cyst, also 1962    OTHER SURGICAL HISTORY  10/26/2019    Pacemaker insertion - carmen Florez    OTHER SURGICAL HISTORY  2005    Cholecystectomy laparoscopic    PILONIDAL CYST DRAINAGE  1961    PILONIDAL CYST DRAINAGE  1962    SINUS SURGERY  10/2013    sack of bacteria from  left sinus (removed)    TOE SURGERY  1985    4 toe nails removed    UMBILICAL HERNIA REPAIR  1986        Social History  She reports that she has never smoked. She has never been exposed to tobacco smoke. She has never used smokeless tobacco. She reports that she does not currently use alcohol. She reports that she does not use drugs.    Family History  Family History   Problem Relation Name Age of Onset    Diabetes Mother      Heart disease Mother      Hypertension Mother      Heart disease Father      Stroke Father      Hypertension Sister      Mental illness Brother      Diabetes Brother      Hypertension Brother          Allergies  Ace inhibitors and Sulfamethoxazole-trimethoprim      Current Outpatient Medications:     aspirin 81 mg EC tablet, Take 1 tablet (81 mg) by mouth once daily., Disp: , Rfl:     calcium citrate/vitamin D3 (CALCIUM CITRATE + D ORAL), as directed Orally, Disp: , Rfl:     clobetasol (Temovate) 0.05 % ointment, Apply 1 Application topically 2 times a day., Disp: , Rfl:     clotrimazole (Lotrimin) 1 % cream, APPLY 1 APPLICATION EXTERNALLY TWICE A DAY, Disp: , Rfl:     cranberry extract 425 mg capsule, as directed Orally, Disp: , Rfl:     cyanocobalamin (Vitamin B-12) 1,000 mcg tablet, Take 1 tablet (1,000 mcg) by mouth., Disp: , Rfl:     diclofenac sodium (Voltaren) 1 % gel gel, APPLY SPARINGLY TO THE AFFECTED AREA 2 TO 3 TIMES A DAY AS NEEDED FOR PAIN., Disp: , Rfl:     diphenoxylate-atropine (Lomotil) 2.5-0.025 mg tablet, Take 1 tablet by mouth 4 times a day as needed., Disp: , Rfl:     docusate sodium (Colace) 100 mg capsule, Take 1 capsule (100 mg) by mouth once daily as needed., Disp: , Rfl:     dulaglutide (Trulicity) 0.75 mg/0.5 mL pen injector, INJECT 1 PEN-INJECTOR UNDER THE SKIN ONCE EVERY WEEK, Disp: 6 mL, Rfl: 3    escitalopram (Lexapro) 10 mg tablet, Take 1 tablet by mouth once daily., Disp: 90 tablet, Rfl: 3    indapamide (Lozol) 2.5 mg tablet, Take 1 tablet by mouth once daily  in the morning., Disp: 90 tablet, Rfl: 3    levothyroxine (Synthroid, Levoxyl) 50 mcg tablet, Take 1 tablet by mouth once daily in the morning on an empty stomach., Disp: 90 tablet, Rfl: 3    metoprolol tartrate (Lopressor) 25 mg tablet, Take 0.5 tablets (12.5 mg) by mouth 2 times a day., Disp: , Rfl:     multivitamin tablet, Take 1 tablet by mouth once daily., Disp: , Rfl:     omega-3 (Fish OiL) 60- mg capsule, Take 1 capsule (500 mg) by mouth 2 times a day., Disp: 90 capsule, Rfl: 3    pantoprazole (ProtoNix) 40 mg EC tablet, Take 1 tablet (40 mg) by mouth once daily in the morning. Take before meals. Do not crush, chew, or split., Disp: , Rfl:     saccharomyces boulardii (Florastor) 250 mg capsule, Take 1 capsule (250 mg) by mouth once daily., Disp: , Rfl:     simvastatin (Zocor) 40 mg tablet, Take 1 tablet by mouth once daily in the evening., Disp: 90 tablet, Rfl: 3    tiZANidine (Zanaflex) 4 mg tablet, Take 1 tablet (4 mg) by mouth once daily as needed., Disp: , Rfl:     triamcinolone (Kenalog) 0.1 % ointment, Apply 1 Application topically 2 times a day., Disp: , Rfl:     gabapentin (Neurontin) 300 mg capsule, TAKE ONE CAPSULE BY MOUTH THREE TIMES A DAY. MAY TAKE 1 ADDTIONAL CAPSULE BY MOUTH AS NEEDED., Disp: 270 capsule, Rfl: 1     Review of Systems   Constitutional: Negative.    HENT: Negative.     Eyes: Negative.    Respiratory: Negative.     Cardiovascular: Negative.    Gastrointestinal: Negative.    Endocrine: Negative.    Genitourinary: Negative.    Musculoskeletal:  Positive for arthralgias, back pain and myalgias. Negative for gait problem, joint swelling, neck pain and neck stiffness.   Skin: Negative.    Allergic/Immunologic: Negative.    Neurological: Negative.    Psychiatric/Behavioral: Negative.          Physical Exam  Vitals and nursing note reviewed.   HENT:      Head: Normocephalic.      Nose: Nose normal.   Eyes:      Extraocular Movements: Extraocular movements intact.       Conjunctiva/sclera: Conjunctivae normal.      Pupils: Pupils are equal, round, and reactive to light.   Cardiovascular:      Rate and Rhythm: Normal rate and regular rhythm.   Pulmonary:      Effort: Pulmonary effort is normal.      Breath sounds: Normal breath sounds.   Musculoskeletal:         General: Tenderness present. No swelling, deformity or signs of injury.      Cervical back: No rigidity or tenderness.      Lumbar back: Tenderness present.      Right lower leg: No edema.      Left lower leg: No edema.      Comments: No neck rigidity.  Full range of motion but with discomfort with lateral flexion.  Positive for Spurling test bilaterally.  Positive for paraspinal tenderness at the cervical region with nonspecific radicular symptoms.  Positive for diffuse tenderness on palpation at the right shoulder joints including AC, GH and biceps muscle.  Full range of motion of arms/shoulders but with discomfort.  Positive for tenderness on palpation at the SI joints and coccyx region.  Positive right leg raise eliciting back pain.  BUE 3-4/5, BLE 3-4/5.   Skin:     General: Skin is warm and dry.   Neurological:      General: No focal deficit present.      Mental Status: She is alert and oriented to person, place, and time.   Psychiatric:         Mood and Affect: Mood normal.         Behavior: Behavior normal.          Last Recorded Vitals  /80   Pulse 75   Temp 36.9 °C (98.4 °F) (Temporal)   Resp 14   Wt 106 kg (233 lb 11 oz)     Relevant Results      Pain Management Panel  More data exists         Latest Ref Rng & Units 3/8/2024 10/27/2023   Pain Management Panel   Amphetamine Screen, Urine Presumptive Negative Presumptive Negative  Presumptive Negative    Barbiturate Screen, Urine Presumptive Negative Presumptive Negative  Presumptive Negative    Benzodiazepines Screen, Urine Presumptive Negative Presumptive Negative  Presumptive Negative    Fentanyl Screen, Urine Presumptive Negative Presumptive Negative   Presumptive Negative    Methadone Screen, Urine Presumptive Negative Presumptive Negative  -        --------  Narrative & Impression   MRN: [id number]  Patient Name: ZACKERY CALVILLO     STUDY:  SHOULDER, CMPLT, MIN 2 VIEWS;  9/16/2022 2:40 pm     INDICATION:  Increasing right shoulder pain.  History of fall where she landed on  her right shoulder.  M25.519: Shoulder pain.     COMPARISON:  None.     ACCESSION NUMBER(S):  [id number]     ORDERING CLINICIAN:  THOMAS MCKEON     FINDINGS:  Right shoulder, four views     There is severe joint space narrowing with sclerosis and  osteophytosis in the glenohumeral joint. Moderate narrowing  osteophytosis in the AC joint. There is no fracture. There is no  dislocation. There is diffuse osteopenia however     IMPRESSION:  Advanced glenohumeral joint osteoarthritis   --------      Assessment/Plan   Problem List Items Addressed This Visit             ICD-10-CM    Chronic right shoulder pain - Primary M25.511, G89.29    Relevant Medications    gabapentin (Neurontin) 300 mg capsule    Other Relevant Orders    FL fluoro images no charge    Arthrocentesis    Arthritis, shoulder region M19.019    Relevant Medications    gabapentin (Neurontin) 300 mg capsule    Other Relevant Orders    FL fluoro images no charge    Arthrocentesis     Other Visit Diagnoses         Codes    Sacroiliitis (CMS-[facility])     M46.1    Relevant Medications    gabapentin (Neurontin) 300 mg capsule                   1. Chronic right shoulder pain  - gabapentin (Neurontin) 300 mg capsule; TAKE ONE CAPSULE BY MOUTH THREE TIMES A DAY. MAY TAKE 1 ADDTIONAL CAPSULE BY MOUTH AS NEEDED.  Dispense: 270 capsule; Refill: 1  - FL fluoro images no charge; Future  - Arthrocentesis; Future    2. Arthritis, shoulder region  - gabapentin (Neurontin) 300 mg capsule; TAKE ONE CAPSULE BY MOUTH THREE TIMES A DAY. MAY TAKE 1 ADDTIONAL CAPSULE BY MOUTH AS NEEDED.  Dispense: 270 capsule; Refill: 1  - FL fluoro images no charge; Future  -  Arthrocentesis; Future    3. Sacroiliitis (CMS-HCC)  - gabapentin (Neurontin) 300 mg capsule; TAKE ONE CAPSULE BY MOUTH THREE TIMES A DAY. MAY TAKE 1 ADDTIONAL CAPSULE BY MOUTH AS NEEDED.  Dispense: 270 capsule; Refill: 1       Plan/Follow-up Instructions:     Continue taking gabapentin 300 mg 3 times a day.    ---------------    Your next appointment is with Dr. Cullen in:    [facility]    For pain block/injection on: 6/14/2024, right shoulder intra-articular injection    LOCAL    No aspirin this day    °You will receive a phone call the weekday before your appointment/procedure.   °Please KEEP your scheduled appointments.     °BRING your photo ID, insurance information, and a correct list of your home medications to EVERY visit.    °Please call our office at [phone] if you have any questions.     You will then be seen for a postprocedure follow-up in 8 to 12 weeks    ---------------        Disclaimer: This note was created using voice recognition software. It was not corrected for typographical or grammatical errors, inadvertent word insertion, or any unintended errors. Please feel free to contact me for clarification.      Time     Prep time on date of the patient encounter: 2  Time spent directly with patient/family/caregiver: 30   Documentation time: 2  Total time on date of patient encounter: 34        Marlen Waite DNP, APRN, FNP-C    [facility]  Office #: [phone]  Fax # [phone]

## 2024-05-31 NOTE — PATIENT INSTRUCTIONS
Continue taking gabapentin 300 mg 3 times a day.    ---------------    Your next appointment is with Dr. Cullen in:    [facility]    For pain block/injection on: 6/14/2024, right shoulder intra-articular injection    LOCAL    No aspirin this day    °You will receive a phone call the weekday before your appointment/procedure.   °Please KEEP your scheduled appointments.     °BRING your photo ID, insurance information, and a correct list of your home medications to EVERY visit.    °Please call our office at [phone] if you have any questions.     You will then be seen for a postprocedure follow-up in 8 to 12 weeks    ---------------

## 2024-05-31 NOTE — PROGRESS NOTES
Last urine drug screening date/ordered today: 05/31/24     Results of last screen: as expected      Last opioid risk screening date/ordered today: 10/27/2023      Pain Scale Screening:   Pain Assessment and Documentation Tool (PADT)   Date of Assessment: 5/31/2024  Analgesia:   Patient reports her pain level on average during the past week is 6on a 0 - 10 scale.   Patient reports that her pain level at its worst during the past week was 8 on a 0 -10 scale.   70% of pain has been relieved during the past week per patient   Patient states that the amount of pain relief she is now obtaining from her current pain reliever(s) is enough to make a real difference in her life.   Query to clinician: Is the patient's pain relief clinically significant? yes  Activities of Daily Living:   Physical functioning: better  Family relationships: unchanged  Social relationships: unchanged  Mood: better  Sleep patterns: better  Overall functioning: worse  Adverse Events: No, Sara Zavala is not experiencing side effects from current pain reliever.  Patients overall severity of side effect:none  Specific Analgesic Plan: Continue present regimen.

## 2024-06-14 ENCOUNTER — HOSPITAL ENCOUNTER (OUTPATIENT)
Dept: PAIN MEDICINE | Facility: HOSPITAL | Age: 79
Discharge: HOME | End: 2024-06-14
Payer: MEDICARE

## 2024-06-14 ENCOUNTER — HOSPITAL ENCOUNTER (OUTPATIENT)
Dept: RADIOLOGY | Facility: HOSPITAL | Age: 79
Discharge: HOME | End: 2024-06-14
Payer: MEDICARE

## 2024-06-14 VITALS
HEART RATE: 65 BPM | SYSTOLIC BLOOD PRESSURE: 152 MMHG | TEMPERATURE: 97.3 F | RESPIRATION RATE: 17 BRPM | OXYGEN SATURATION: 96 % | DIASTOLIC BLOOD PRESSURE: 65 MMHG | WEIGHT: 222.66 LBS | BODY MASS INDEX: 40.98 KG/M2 | HEIGHT: 62 IN

## 2024-06-14 DIAGNOSIS — M19.019 ARTHRITIS, SHOULDER REGION: ICD-10-CM

## 2024-06-14 DIAGNOSIS — M25.511 CHRONIC RIGHT SHOULDER PAIN: ICD-10-CM

## 2024-06-14 DIAGNOSIS — G89.29 CHRONIC RIGHT SHOULDER PAIN: ICD-10-CM

## 2024-06-14 PROCEDURE — 20610 DRAIN/INJ JOINT/BURSA W/O US: CPT | Performed by: ANESTHESIOLOGY

## 2024-06-14 PROCEDURE — 2550000001 HC RX 255 CONTRASTS: Performed by: ANESTHESIOLOGY

## 2024-06-14 PROCEDURE — 2500000004 HC RX 250 GENERAL PHARMACY W/ HCPCS (ALT 636 FOR OP/ED): Mod: JZ | Performed by: ANESTHESIOLOGY

## 2024-06-14 RX ORDER — TRIAMCINOLONE ACETONIDE 40 MG/ML
INJECTION, SUSPENSION INTRA-ARTICULAR; INTRAMUSCULAR AS NEEDED
Status: COMPLETED | OUTPATIENT
Start: 2024-06-14 | End: 2024-06-14

## 2024-06-14 RX ORDER — BUPIVACAINE HYDROCHLORIDE 2.5 MG/ML
INJECTION, SOLUTION EPIDURAL; INFILTRATION; INTRACAUDAL AS NEEDED
Status: COMPLETED | OUTPATIENT
Start: 2024-06-14 | End: 2024-06-14

## 2024-06-14 ASSESSMENT — ENCOUNTER SYMPTOMS
OCCASIONAL FEELINGS OF UNSTEADINESS: 1
LOSS OF SENSATION IN FEET: 1
DEPRESSION: 0

## 2024-06-14 ASSESSMENT — PAIN SCALES - GENERAL
PAINLEVEL_OUTOF10: 5 - MODERATE PAIN
PAINLEVEL_OUTOF10: 0 - NO PAIN

## 2024-06-14 ASSESSMENT — PATIENT HEALTH QUESTIONNAIRE - PHQ9
SUM OF ALL RESPONSES TO PHQ9 QUESTIONS 1 AND 2: 0
1. LITTLE INTEREST OR PLEASURE IN DOING THINGS: NOT AT ALL
2. FEELING DOWN, DEPRESSED OR HOPELESS: NOT AT ALL

## 2024-06-14 ASSESSMENT — COLUMBIA-SUICIDE SEVERITY RATING SCALE - C-SSRS
6. HAVE YOU EVER DONE ANYTHING, STARTED TO DO ANYTHING, OR PREPARED TO DO ANYTHING TO END YOUR LIFE?: NO
1. IN THE PAST MONTH, HAVE YOU WISHED YOU WERE DEAD OR WISHED YOU COULD GO TO SLEEP AND NOT WAKE UP?: NO
2. HAVE YOU ACTUALLY HAD ANY THOUGHTS OF KILLING YOURSELF?: NO

## 2024-06-14 ASSESSMENT — PAIN - FUNCTIONAL ASSESSMENT
PAIN_FUNCTIONAL_ASSESSMENT: 0-10
PAIN_FUNCTIONAL_ASSESSMENT: 0-10

## 2024-06-14 NOTE — DISCHARGE INSTRUCTIONS
Discharge Instructions:   ° Keep Band-Aid on for the next 24 hours.    ° No showering/bathing for the next 24 hours.    ° You may notice soreness or increased pain in the area of your injection, which may continue for the first 48 hours.    ° Avoid driving or operating any heavy machinery until the next day after the procedure.  ° You should resume any medications and your regular diet after the procedure.  ° You may resume regular daily activity but should avoid strenuous activity the day of the procedure.  Some of the side affects you may experience from the steroids are:  ° Insomnia (inability to sleep)  ° Increased sweating  ° Headaches  ° Increased fluid retention (swelling of your extremities)  ° Increase appetite  ° Face flushing  ° If you are a diabetic, your blood sugars may go up.  Closely monitor your diet.  Your blood sugar should return to normal in a few days.  Complications:   ° Complications are rare with the most common being temporary increase pain near the injection site. You can apply ice to affected area on the day of the procedure.   ° If the discomfort persists, apply moist heat to the area. Serious complications are very uncommon but may include bleeding, infection or nerve damage.   ° If severe pain, fever, redness or swelling near the injection site, have someone take you to the nearest emergency room to be evaluated for procedure complications or infection.  Expectations:   ° Local anesthetics wear off in several hours but duration of relief varies from individual to individual.     If you have any questions, please call the office at [phone].    If this is an emergency, call [phone] or go to your nearest hospital.

## 2024-07-15 PROBLEM — R26.2 DIFFICULTY WALKING: Status: ACTIVE | Noted: 2024-07-15

## 2024-07-19 ENCOUNTER — LAB (OUTPATIENT)
Dept: LAB | Facility: LAB | Age: 79
End: 2024-07-19
Payer: MEDICARE

## 2024-07-19 ENCOUNTER — TELEPHONE (OUTPATIENT)
Dept: PRIMARY CARE | Facility: CLINIC | Age: 79
End: 2024-07-19

## 2024-07-19 DIAGNOSIS — R30.0 DYSURIA: Primary | ICD-10-CM

## 2024-07-19 DIAGNOSIS — R30.0 DYSURIA: ICD-10-CM

## 2024-07-19 LAB
APPEARANCE UR: CLEAR
BACTERIA #/AREA URNS AUTO: ABNORMAL /HPF
BILIRUB UR STRIP.AUTO-MCNC: NEGATIVE MG/DL
COLOR UR: YELLOW
GLUCOSE UR STRIP.AUTO-MCNC: NORMAL MG/DL
KETONES UR STRIP.AUTO-MCNC: NEGATIVE MG/DL
LEUKOCYTE ESTERASE UR QL STRIP.AUTO: ABNORMAL
MUCOUS THREADS #/AREA URNS AUTO: ABNORMAL /LPF
NITRITE UR QL STRIP.AUTO: NEGATIVE
PH UR STRIP.AUTO: 6 [PH]
PROT UR STRIP.AUTO-MCNC: NEGATIVE MG/DL
RBC # UR STRIP.AUTO: NEGATIVE /UL
RBC #/AREA URNS AUTO: ABNORMAL /HPF
SP GR UR STRIP.AUTO: 1.02
SQUAMOUS #/AREA URNS AUTO: ABNORMAL /HPF
UROBILINOGEN UR STRIP.AUTO-MCNC: NORMAL MG/DL
WBC #/AREA URNS AUTO: ABNORMAL /HPF

## 2024-07-19 PROCEDURE — 87086 URINE CULTURE/COLONY COUNT: CPT

## 2024-07-19 PROCEDURE — 81001 URINALYSIS AUTO W/SCOPE: CPT

## 2024-07-19 NOTE — TELEPHONE ENCOUNTER
Phoned patient in follow up and notified her that an order was placed for a urine specimen.   PHILIP Tamez - patient also noted that she thought you were going to draw labs so she will be fasting for your visit on Monday.

## 2024-07-19 NOTE — TELEPHONE ENCOUNTER
PHILIP Pretty I phoned patient to confirm 7/22/24 [facility] visit with Joi Breen NP. Patient reported bladder pressure, some relief after urinating. c/o burning discomfort with urinating and odor to urine. No temperature. Symptoms x 1 week. She reported she has a urine container and a hat at home to collect a specimen. Her brother reported he could drop the specimen off at the [facility] lab today. Could you order a urine test?

## 2024-07-21 LAB — BACTERIA UR CULT: ABNORMAL

## 2024-07-21 NOTE — TELEPHONE ENCOUNTER
Result Communication    Resulted Orders   Urinalysis with Reflex Microscopic   Result Value Ref Range    Color, Urine Yellow Light-Yellow, Yellow, Dark-Yellow    Appearance, Urine Clear Clear    Specific Gravity, Urine 1.023 1.005 - 1.035    pH, Urine 6.0 5.0, 5.5, 6.0, 6.5, 7.0, 7.5, 8.0    Protein, Urine NEGATIVE NEGATIVE, 10 (TRACE), 20 (TRACE) mg/dL    Glucose, Urine Normal Normal mg/dL    Blood, Urine NEGATIVE NEGATIVE    Ketones, Urine NEGATIVE NEGATIVE mg/dL    Bilirubin, Urine NEGATIVE NEGATIVE    Urobilinogen, Urine Normal Normal mg/dL    Nitrite, Urine NEGATIVE NEGATIVE    Leukocyte Esterase, Urine 250 [place]/µL (A) NEGATIVE   Urine Culture   Result Value Ref Range    Urine Culture (A)      Multiple organisms present, probable contamination. Repeat culture if clinically indicated.   Microscopic Only, Urine   Result Value Ref Range    WBC, Urine 21-50 (A) 1-5, NONE /HPF    RBC, Urine 3-5 NONE, 1-2, 3-5 /HPF    Squamous Epithelial Cells, Urine 1-9 (SPARSE) Reference range not established. /HPF    Bacteria, Urine 2+ (A) NONE SEEN /HPF    Mucus, Urine FEW Reference range not established. /LPF       11:59 AM      Results were successfully communicated with the  Patient's brother  and they acknowledged their understanding.  Pt still with symptoms and seeing Joi tomorrow where she will try to provide her with a clean urine. Instructed her to clean herself thoroughly before giving the sample. Pt sleeping so information relayed to brother per request. He verbalizes understanding.   Katerin Laws, APRN-CNP

## 2024-07-22 ENCOUNTER — OFFICE VISIT (OUTPATIENT)
Dept: PRIMARY CARE | Facility: CLINIC | Age: 79
End: 2024-07-22
Payer: MEDICARE

## 2024-07-22 VITALS
HEIGHT: 62 IN | TEMPERATURE: 97.4 F | DIASTOLIC BLOOD PRESSURE: 78 MMHG | HEART RATE: 71 BPM | WEIGHT: 222 LBS | SYSTOLIC BLOOD PRESSURE: 136 MMHG | RESPIRATION RATE: 18 BRPM | OXYGEN SATURATION: 96 % | BODY MASS INDEX: 40.85 KG/M2

## 2024-07-22 DIAGNOSIS — G62.9 POLYNEUROPATHY: ICD-10-CM

## 2024-07-22 DIAGNOSIS — K21.9 GASTROESOPHAGEAL REFLUX DISEASE WITHOUT ESOPHAGITIS: ICD-10-CM

## 2024-07-22 DIAGNOSIS — R30.0 DYSURIA: ICD-10-CM

## 2024-07-22 DIAGNOSIS — E78.1 HIGH TRIGLYCERIDES: ICD-10-CM

## 2024-07-22 DIAGNOSIS — E03.9 ACQUIRED HYPOTHYROIDISM: ICD-10-CM

## 2024-07-22 DIAGNOSIS — E11.319 DIABETIC RETINOPATHY ASSOCIATED WITH CONTROLLED TYPE 2 DIABETES MELLITUS (MULTI): Primary | ICD-10-CM

## 2024-07-22 DIAGNOSIS — I10 ESSENTIAL HYPERTENSION: ICD-10-CM

## 2024-07-22 PROCEDURE — 99349 HOME/RES VST EST MOD MDM 40: CPT | Performed by: NURSE PRACTITIONER

## 2024-07-22 PROCEDURE — 1036F TOBACCO NON-USER: CPT | Performed by: NURSE PRACTITIONER

## 2024-07-22 PROCEDURE — 3078F DIAST BP <80 MM HG: CPT | Performed by: NURSE PRACTITIONER

## 2024-07-22 PROCEDURE — 3075F SYST BP GE 130 - 139MM HG: CPT | Performed by: NURSE PRACTITIONER

## 2024-07-22 PROCEDURE — 1125F AMNT PAIN NOTED PAIN PRSNT: CPT | Performed by: NURSE PRACTITIONER

## 2024-07-22 PROCEDURE — 1159F MED LIST DOCD IN RCRD: CPT | Performed by: NURSE PRACTITIONER

## 2024-07-22 PROCEDURE — 1160F RVW MEDS BY RX/DR IN RCRD: CPT | Performed by: NURSE PRACTITIONER

## 2024-07-22 ASSESSMENT — PAIN SCALES - GENERAL: PAINLEVEL: 2

## 2024-07-22 NOTE — PROGRESS NOTES
Subjective   Patient ID: Sara Zavala is a 79 y.o. female who presents for Follow-up (Routine 3 month follow up).    Visit for 80 y/o female seen today in private home, alone for routine follow up. PMHx of HTN, High Triglycerides, Hypothyroidism, DM, Retinopathy, GERD, Iron deficiency anemia, Polyneuropathy, Psoriatic Arthritis, Vitamin D Deficiency, difficulty ambulating. Pt is sitting on recliner this morning watching TV. She is alert, oriented, able to answer all questions without difficulty. Pt lives in a single story home with her brother. She has a vehicle but her brother does most of the driving as she needs dropped off at the door for any medical appointments due to limited mobility, chronic back pain. Pt reports that she is only leaving the house now for necessary medical appointments as she had a mechanical fall on 5/22. She was walking outside to get into the car and ended up falling. She called 911 for a lift assist but did not go to the emergency department for evaluation. Pt had pain to coccyx but reports that it is improving. She remains active with her pain management team and has a follow up scheduled on 8/9. Pt is able to manage her own medications. She is able to do her own dressing and toileting but requires assistance with meal preparation. Pt denies appetite changes or signs of weight loss. She does not have a scale in the home but will have weight checked at her next pain mgmt appointment. She denies abdominal pain, nausea, vomiting. She endorses chronic constipation and takes stool softeners with improvement. She reported some burning with urination of Friday. She had a urinalysis, positive for leuks, negative for nitrites but culture came back likely contaminated. She denies any further urinary concerns today. Denies fever, chills, dysuria, hematuria, foul odor.      Chronic pain- remains active with Dr. Minaya, pain management and Marlen Waite. She endorses chronic pain, mostly in  lower back, right shoulder and bilateral knees. She takes Gabapentin and uses Voltaren gel with improvement.      HTN- cardiologist is Dr. Young. Pt has follow up scheduled in August and will be having a 2D Echo. She has a pacemaker and it is due to be checked on 8/5. Pt denies headaches, chest pain, palpitations, increased shortness of breath, edema or signs of weight gain. Patient has a home BP Cuff and will occasionally monitor her BP.     DM- patient monitors her glucose levels daily. Glucometer reviewed in home. Values range from . Her last A1c was 5.8. She continues on Trulicity weekly. Pt denies dizziness, lightheadedness, s/sx of hypoglycemia. Podiatrist is Dr. Zee. She follows with her eye specialist Dr. Watts annually and will be seeing Dr. Topete on 8/1.      Home Visit:          Medically necessary due to: Illness or condition that results in activity lmitation or restriction that impacts the ability to leave home such as:, unsteady gait/poor balance         Current Outpatient Medications:     aspirin 81 mg EC tablet, Take 1 tablet (81 mg) by mouth once daily., Disp: , Rfl:     calcium citrate/vitamin D3 (CALCIUM CITRATE + D ORAL), as directed Orally, Disp: , Rfl:     clobetasol (Temovate) 0.05 % ointment, Apply 1 Application topically 2 times a day., Disp: , Rfl:     clotrimazole (Lotrimin) 1 % cream, APPLY 1 APPLICATION EXTERNALLY TWICE A DAY, Disp: , Rfl:     cranberry extract 425 mg capsule, as directed Orally, Disp: , Rfl:     cyanocobalamin (Vitamin B-12) 1,000 mcg tablet, Take 1 tablet (1,000 mcg) by mouth., Disp: , Rfl:     diclofenac sodium (Voltaren) 1 % gel gel, APPLY SPARINGLY TO THE AFFECTED AREA 2 TO 3 TIMES A DAY AS NEEDED FOR PAIN., Disp: , Rfl:     diphenoxylate-atropine (Lomotil) 2.5-0.025 mg tablet, Take 1 tablet by mouth 4 times a day as needed., Disp: , Rfl:     docusate sodium (Colace) 100 mg capsule, Take 1 capsule (100 mg) by mouth once daily as needed., Disp: , Rfl:      dulaglutide (Trulicity) 0.75 mg/0.5 mL pen injector, INJECT 1 PEN-INJECTOR UNDER THE SKIN ONCE EVERY WEEK, Disp: 6 mL, Rfl: 3    escitalopram (Lexapro) 10 mg tablet, Take 1 tablet by mouth once daily., Disp: 90 tablet, Rfl: 3    gabapentin (Neurontin) 300 mg capsule, TAKE ONE CAPSULE BY MOUTH THREE TIMES A DAY. MAY TAKE 1 ADDTIONAL CAPSULE BY MOUTH AS NEEDED., Disp: 270 capsule, Rfl: 1    indapamide (Lozol) 2.5 mg tablet, Take 1 tablet by mouth once daily in the morning., Disp: 90 tablet, Rfl: 3    levothyroxine (Synthroid, Levoxyl) 50 mcg tablet, Take 1 tablet by mouth once daily in the morning on an empty stomach., Disp: 90 tablet, Rfl: 3    metoprolol tartrate (Lopressor) 25 mg tablet, Take 0.5 tablets (12.5 mg) by mouth 2 times a day., Disp: , Rfl:     multivitamin tablet, Take 1 tablet by mouth once daily., Disp: , Rfl:     omega-3 (Fish OiL) 60- mg capsule, Take 1 capsule (500 mg) by mouth 2 times a day., Disp: 90 capsule, Rfl: 3    pantoprazole (ProtoNix) 40 mg EC tablet, Take 1 tablet (40 mg) by mouth once daily in the morning. Take before meals. Do not crush, chew, or split., Disp: , Rfl:     saccharomyces boulardii (Florastor) 250 mg capsule, Take 1 capsule (250 mg) by mouth once daily., Disp: , Rfl:     simvastatin (Zocor) 40 mg tablet, Take 1 tablet by mouth once daily in the evening., Disp: 90 tablet, Rfl: 3    tiZANidine (Zanaflex) 4 mg tablet, Take 1 tablet (4 mg) by mouth once daily as needed., Disp: , Rfl:     triamcinolone (Kenalog) 0.1 % ointment, Apply 1 Application topically 2 times a day., Disp: , Rfl:      Review of Systems  Constitutional:  Negative for appetite change, chills, fever and unexpected weight change.   HENT:  Negative for trouble swallowing.    Respiratory:  Positive for shortness of breath on exertion. Negative for cough, wheezing.    Cardiovascular:  Negative for chest pain, palpitations and leg swelling.   Gastrointestinal: Positive for constipation. Negative for  "abdominal pain, nausea and vomiting.   Endocrine: Positive for diabetes, thyroid disease  Genitourinary:  Negative for difficulty urinating.   Musculoskeletal:  Positive for arthralgias, unsteady gait, chronic lower back, right shoulder, bilateral knee pain  Neurological:  Negative for dizziness and light-headedness.   Psychiatric/Behavioral: Positive for depression     Objective   /78 (BP Location: Left arm, Patient Position: Sitting, BP Cuff Size: Adult)   Pulse 71   Temp 36.3 °C (97.4 °F) (Temporal)   Resp 18   Ht 1.575 m (5' 2\")   Wt 101 kg (222 lb)   SpO2 96%   BMI 40.60 kg/m²     Physical Exam  Constitutional:       General: She is not in acute distress.     Appearance: Normal appearance. She is overweight      Comments: Sitting in recliner, legs dependent    HENT:      Head: Normocephalic and atraumatic.      Nose: Nose normal.      Mouth/Throat:      Mouth: Mucous membranes are moist.      Pharynx: Oropharynx is clear.   Eyes:      Extraocular Movements: Extraocular movements intact.      Pupils: Pupils are equal, round, and reactive to light.      Comments: wearing glasses   Cardiovascular:      Rate and Rhythm: Normal rate and regular rhythm.      Heart sounds: Normal heart sounds. No murmur heard.     No friction rub. No gallop.   Pulmonary:      Effort: Pulmonary effort is normal. No respiratory distress.      Breath sounds: Normal breath sounds. No wheezing, rhonchi or rales.   Abdominal:      General: Bowel sounds are normal. There is no distension.      Palpations: Abdomen is soft.      Comments: large hernia present    Musculoskeletal:         General: modest deformity of bilateral knees      Cervical back: Neck supple.      Right lower leg: No edema.      Left lower leg: No edema.   Skin:     General: Skin is warm and dry.   Neurological:      General: No focal deficit present.      Mental Status: She is alert and oriented to person, place, and time.      Motor: generalized weakness     " " Gait: Unsteady    Psychiatric:         Mood and Affect: Mood normal.         Behavior: Behavior normal.     Lab Results   Component Value Date    WBC 6.9 04/22/2024    HGB 13.4 04/22/2024    HCT 41.0 04/22/2024    MCV 94 04/22/2024     04/22/2024       Chemistry    Lab Results   Component Value Date/Time     04/22/2024 1037    K 4.0 04/22/2024 1037     04/22/2024 1037    CO2 32 04/22/2024 1037    BUN 22 04/22/2024 1037    CREATININE 0.89 04/22/2024 1037    Lab Results   Component Value Date/Time    CALCIUM 9.1 04/22/2024 1037    ALKPHOS 48 04/22/2024 1037    AST 21 04/22/2024 1037    ALT 18 04/22/2024 1037    BILITOT 0.5 04/22/2024 1037        Lab Results   Component Value Date    CHOL 132 04/22/2024    CHOL 151 10/23/2023    CHOL 160 04/21/2023     Lab Results   Component Value Date    HDL 37.1 04/22/2024    HDL 40.3 10/23/2023    HDL 37 (L) 04/21/2023     Lab Results   Component Value Date    LDLCALC 60 04/22/2024    LDLCALC 66 10/23/2023    LDLCALC 46 (L) 04/21/2023     Lab Results   Component Value Date    TRIG 177 (H) 04/22/2024    TRIG 225 (H) 10/23/2023    TRIG 386 (H) 04/21/2023     No components found for: \"CHOLHDL\"     Lab Results   Component Value Date    HGBA1C 5.8 (H) 04/22/2024     Lab Results   Component Value Date    TSH 2.17 04/22/2024     Assessment/Plan   Diagnoses and all orders for this visit:  Diabetic retinopathy associated with controlled type 2 diabetes mellitus (Multi)  -chronic, stable, last A1c 5.8  -continue Trulicity   -continue to monitor BG levels daily  -follow up with eye specialist as scheduled    Essential hypertension  -chronic, vitals stable  -continue aspirin, metoprolol  -follow up with cardiology as scheduled     High triglycerides  -chronic, improving   -continue fish oil supplement     Acquired hypothyroidism  -chronic, last TSH stable  -continue Levothyroxine     Gastroesophageal reflux disease without esophagitis  -chronic, stable  -no current GI " concerns   -continue pantoprazole, probiotic     Polyneuropathy  -chronic, followed by pain management   -continue Gabapentin, Voltaren gel, Tizanidine as needed    Dysuria  -acute, urine culture negative  -no further urinary concerns today       LUDIVINA Vail-CNP

## 2024-08-09 ENCOUNTER — APPOINTMENT (OUTPATIENT)
Dept: PAIN MEDICINE | Facility: HOSPITAL | Age: 79
End: 2024-08-09
Payer: MEDICARE

## 2024-08-24 DIAGNOSIS — E11.319 DIABETIC RETINOPATHY ASSOCIATED WITH CONTROLLED TYPE 2 DIABETES MELLITUS (MULTI): Primary | ICD-10-CM

## 2024-08-26 RX ORDER — CALCIUM CITRATE/VITAMIN D3 200MG-6.25
TABLET ORAL
Qty: 100 EACH | Refills: 3 | Status: SHIPPED | OUTPATIENT
Start: 2024-08-26

## 2024-09-13 ENCOUNTER — LAB (OUTPATIENT)
Dept: LAB | Facility: LAB | Age: 79
End: 2024-09-13
Payer: MEDICARE

## 2024-09-13 ENCOUNTER — OFFICE VISIT (OUTPATIENT)
Dept: PAIN MEDICINE | Facility: HOSPITAL | Age: 79
End: 2024-09-13
Payer: MEDICARE

## 2024-09-13 VITALS
BODY MASS INDEX: 40.85 KG/M2 | TEMPERATURE: 97.9 F | HEART RATE: 62 BPM | OXYGEN SATURATION: 97 % | DIASTOLIC BLOOD PRESSURE: 62 MMHG | HEIGHT: 62 IN | WEIGHT: 222 LBS | RESPIRATION RATE: 14 BRPM | SYSTOLIC BLOOD PRESSURE: 101 MMHG

## 2024-09-13 DIAGNOSIS — M47.812 FACET ARTHROPATHY, CERVICAL: ICD-10-CM

## 2024-09-13 DIAGNOSIS — M25.511 CHRONIC RIGHT SHOULDER PAIN: Primary | ICD-10-CM

## 2024-09-13 DIAGNOSIS — G89.29 CHRONIC RIGHT SHOULDER PAIN: Primary | ICD-10-CM

## 2024-09-13 DIAGNOSIS — M19.019 ARTHRITIS, SHOULDER REGION: ICD-10-CM

## 2024-09-13 DIAGNOSIS — Z79.899 MEDICATION MANAGEMENT: ICD-10-CM

## 2024-09-13 DIAGNOSIS — M47.817 FACET ARTHROPATHY, LUMBOSACRAL: ICD-10-CM

## 2024-09-13 DIAGNOSIS — M54.12 CERVICAL RADICULOPATHY: ICD-10-CM

## 2024-09-13 DIAGNOSIS — M79.18 MYOFASCIAL PAIN: ICD-10-CM

## 2024-09-13 PROCEDURE — 80307 DRUG TEST PRSMV CHEM ANLYZR: CPT

## 2024-09-13 PROCEDURE — 36415 COLL VENOUS BLD VENIPUNCTURE: CPT

## 2024-09-13 PROCEDURE — 99213 OFFICE O/P EST LOW 20 MIN: CPT | Performed by: NURSE PRACTITIONER

## 2024-09-13 RX ORDER — GABAPENTIN 300 MG/1
CAPSULE ORAL
Qty: 270 CAPSULE | Refills: 1 | Status: SHIPPED | OUTPATIENT
Start: 2024-09-13

## 2024-09-13 RX ORDER — BACLOFEN 5 MG/1
5 TABLET ORAL NIGHTLY PRN
Qty: 30 TABLET | Refills: 0 | Status: SHIPPED | OUTPATIENT
Start: 2024-09-13

## 2024-09-13 ASSESSMENT — ENCOUNTER SYMPTOMS
MYALGIAS: 1
CONSTITUTIONAL NEGATIVE: 1
JOINT SWELLING: 0
PSYCHIATRIC NEGATIVE: 1
NECK STIFFNESS: 0
ARTHRALGIAS: 1
NEUROLOGICAL NEGATIVE: 1
ENDOCRINE NEGATIVE: 1
EYES NEGATIVE: 1
GASTROINTESTINAL NEGATIVE: 1
BACK PAIN: 1
RESPIRATORY NEGATIVE: 1
CARDIOVASCULAR NEGATIVE: 1
ALLERGIC/IMMUNOLOGIC NEGATIVE: 1
NECK PAIN: 1

## 2024-09-13 ASSESSMENT — PAIN SCALES - GENERAL: PAINLEVEL: 4

## 2024-09-13 NOTE — PATIENT INSTRUCTIONS
Continue with your prescribed medications.    Continue taking gabapentin 300 mg 3 times a day.  You may take extra 300 mg as needed.  Do not take sedating medications together.    I discontinued your tizanidine.  You may start taking baclofen as needed for muscle pain relief.  You may cut it in half if the 1 pill is too much for you.  Do not take sedating medications together.    I will see you for your follow-up visit in 2 months.

## 2024-09-13 NOTE — PROGRESS NOTES
Last urine drug screening date/ordered today: 3/8/2024  Results of last screen: as expected      Opioid Risk Screening:   THE OPIOID RISK TOOL (ORT)                                                                      Female                     Male     1. Family History of Substance Abuse:                              Alcohol                                                   [1]=   0                       [3]  =     Illicit Drugs                                             [2] =    0                      [3]   =    Prescription Drugs                                [4]=       0                    [4]   =    2. Personal History of Substance Abuse:     Alcohol                                                    [3] =   0                       [3] =     Illegal Drugs                                           [4] =  0                         [4]  =     Prescription Drugs                                [5]=    0                        [5]   =    3. Age (If between 16 to 45):               [1]=                           [1]   =    4. History of Preadolescent Sexual Abuse                                                                  [3]=   0                         [0]   =    5. Psychological Disease:    ADD, OCD, Bipolar, Schizophrenia    [2]=    0                        [2]   =    Depression                                          [1]=    1                         [1]   =      TOTAL Score =  1     Last opioid risk screening date/ordered today: 09/13/2024  Patient's total score is 1    Reference :  Low Score = 0 to 3  Moderate Score = 4 to 7  High Score = =8       Pain Scale Screening:   Pain Assessment and Documentation Tool (PADT)   Date of Assessment: 09/13/2024  Analgesia:   Patient reports her pain level on average during the past week is 6on a 0 - 10 scale.   Patient reports that her pain level at its worst during the past week was 6 on a 0 -10 scale.   50% of pain has been relieved during the past week per  patient   Patient states that the amount of pain relief she is now obtaining from her current pain reliever(s) is enough to make a real difference in her life.   Query to clinician: Is the patient's pain relief clinically significant? yes  Activities of Daily Living:   Physical functioning: better  Family relationships: unchanged  Social relationships: unchanged  Mood: unchanged  Sleep patterns: unchanged  Overall functioning: unchanged  Adverse Events: No, Sara Zavala is not experiencing side effects from current pain reliever.  Patients overall severity of side effect:none  Specific Analgesic Plan: Continue present regimen.

## 2024-09-13 NOTE — PROGRESS NOTES
Subjective   Sara Zavala is a pleasant 79 y.o. female who is here for postprocedure follow-up. Patient presents in a wheelchair. She arrives with her brother.     Patient had right shoulder injection done on 6/14/2024. The injection has improved her arm pain.  She reports it provided 50% relief that lasted for almost 3 months.  The injection has improved her pain, the need for pain medication, and her ability to participate in her daily activities.    Patient continues to have chronic pain to her neck and her back.  She continues to have pain to her right shoulder.  She rates her pain as 4 out of 10.  She describes it as dull.  She continues to have pins and needle sensation to her right arm.  She denies increasing arm weakness, weakness in  or dropping objects.  She denies increasing leg weakness or change in balance.  She denies bowel or bladder incontinence.  She denies recent falls, injuries, or ER visits.  There has been no changes since she was last seen.    Patient continues to take gabapentin 300 mg 3 times a day.  She has not taken extra 300 mg.  She takes Tylenol as needed.  Patient has not been taking tizanidine as it makes her mouth too dry.    I reviewed previous notes and imaging.  I discussed the plan of care.  Patient had cervical DEE but it was a difficult procedure.  She does not want any injection at this point.  She will repeat her right shoulder injection before winter.  I will see her for follow-up visit.  Questions were answered during this encounter.    JAYESH and NDI was done today where she both scored 35.  The forms were scanned.    The patient was counseled regarding diagnostic results, instructions for management, risk factor reductions, risks and benefits of treatment options and importance of compliance with treatment.    ---------------------  PROCEDURES:    6/14/2024: Right shoulder injection  3/26/2024: Right shoulder injection  8/29/2023: Right shoulder  injection  6/27/2023: Right shoulder injection  12/6/2022: Cervical DEE #1.  No relief  -------------    OARRS:  LUDIVINA Bray-YELITZA, DNP on 9/13/2024  9:45 AM  I have personally reviewed the OARRS report for Sara Zavala. I have considered the risks of abuse, dependence, addiction and diversion    Review of Systems   Constitutional: Negative.    HENT: Negative.     Eyes: Negative.    Respiratory: Negative.     Cardiovascular: Negative.    Gastrointestinal: Negative.    Endocrine: Negative.    Genitourinary: Negative.    Musculoskeletal:  Positive for arthralgias, back pain, myalgias and neck pain. Negative for gait problem, joint swelling and neck stiffness.   Skin: Negative.    Allergic/Immunologic: Negative.    Neurological: Negative.    Psychiatric/Behavioral: Negative.            /62 (BP Location: Right arm, Patient Position: Sitting, BP Cuff Size: Adult)   Pulse 62   Temp 36.6 °C (97.9 °F) (Tympanic)   Resp 14   Wt 101 kg (222 lb)   SpO2 97%  Body mass index is 41.27 kg/m².      Objective       Past Medical History  She has a past medical history of Abdominal hernia, Anemia, iron deficiency, Anxiety disorder, Atherosclerotic heart disease of native coronary artery without angina pectoris, Chronic back pain, Diabetes (Multi), Diabetic neuropathy (Multi), Diabetic retinopathy (Multi), Enlarged uterus, Hyperlipidemia, Hypertension, Hypothyroidism, Left ventricular ejection fraction greater than 40% (07/22/2019), Low back pain, Morbid obesity (Multi), Other specified diabetes mellitus without complications (Multi), Peptic ulcer disease, Personal history of other diseases of the circulatory system, Psoriasis, Vitamin B12 deficiency, and Vitamin D deficiency.    Surgical History  Past Surgical History:   Procedure Laterality Date    APPENDECTOMY  2005    CARDIAC CATHETERIZATION  05/03/2017    COLONOSCOPY  08/04/2010    COLONOSCOPY  07/31/2015    EPIDURAL BLOCK INJECTION  09/21/2010    EPIDURAL  BLOCK INJECTION  10/2013    Dr. Cote    OTHER SURGICAL HISTORY      Carpal tunnel surgery    OTHER SURGICAL HISTORY      Phacoemulsification of cataract and insertion of intraocular lens    OTHER SURGICAL HISTORY  2004    Bariatric surgery - gastric bypass    OTHER SURGICAL HISTORY  1961    Incision and drainage of pilonidal cyst, also 1962    OTHER SURGICAL HISTORY  10/26/2019    Pacemaker insertion - carmen Florez    OTHER SURGICAL HISTORY  2005    Cholecystectomy laparoscopic    PILONIDAL CYST DRAINAGE  1961    PILONIDAL CYST DRAINAGE  1962    SINUS SURGERY  10/2013    sack of bacteria from left sinus (removed)    TOE SURGERY  1985    4 toe nails removed    UMBILICAL HERNIA REPAIR  1986        Social History  She reports that she has never smoked. She has never been exposed to tobacco smoke. She has never used smokeless tobacco. She reports that she does not currently use alcohol. She reports that she does not use drugs.    Family History  Family History   Problem Relation Name Age of Onset    Diabetes Mother      Heart disease Mother      Hypertension Mother      Heart disease Father      Stroke Father      Hypertension Sister      Mental illness Brother      Diabetes Brother      Hypertension Brother          Allergies  Ace inhibitors, Sulfamethoxazole-trimethoprim, and Tizanidine    MEDICATIONS:    Current Outpatient Medications:     aspirin 81 mg EC tablet, Take 1 tablet (81 mg) by mouth once daily., Disp: , Rfl:     blood sugar diagnostic (True Metrix Glucose Test Strip) strip, test twice daily as directed, Disp: 100 each, Rfl: 3    calcium citrate/vitamin D3 (CALCIUM CITRATE + D ORAL), as directed Orally, Disp: , Rfl:     clobetasol (Temovate) 0.05 % ointment, Apply 1 Application topically 2 times a day., Disp: , Rfl:     clotrimazole (Lotrimin) 1 % cream, APPLY 1 APPLICATION EXTERNALLY TWICE A DAY, Disp: , Rfl:     cyanocobalamin (Vitamin B-12) 1,000 mcg tablet, Take 1 tablet (1,000 mcg) by  mouth., Disp: , Rfl:     diclofenac sodium (Voltaren) 1 % gel gel, APPLY SPARINGLY TO THE AFFECTED AREA 2 TO 3 TIMES A DAY AS NEEDED FOR PAIN., Disp: , Rfl:     diphenoxylate-atropine (Lomotil) 2.5-0.025 mg tablet, Take 1 tablet by mouth 4 times a day as needed., Disp: , Rfl:     docusate sodium (Colace) 100 mg capsule, Take 1 capsule (100 mg) by mouth once daily as needed., Disp: , Rfl:     dulaglutide (Trulicity) 0.75 mg/0.5 mL pen injector, INJECT 1 PEN-INJECTOR UNDER THE SKIN ONCE EVERY WEEK, Disp: 6 mL, Rfl: 3    escitalopram (Lexapro) 10 mg tablet, Take 1 tablet by mouth once daily., Disp: 90 tablet, Rfl: 3    indapamide (Lozol) 2.5 mg tablet, Take 1 tablet by mouth once daily in the morning., Disp: 90 tablet, Rfl: 3    levothyroxine (Synthroid, Levoxyl) 50 mcg tablet, Take 1 tablet by mouth once daily in the morning on an empty stomach., Disp: 90 tablet, Rfl: 3    metoprolol tartrate (Lopressor) 25 mg tablet, Take 0.5 tablets (12.5 mg) by mouth 2 times a day., Disp: , Rfl:     multivitamin tablet, Take 1 tablet by mouth once daily., Disp: , Rfl:     omega-3 (Fish OiL) 60- mg capsule, Take 1 capsule (500 mg) by mouth 2 times a day., Disp: 90 capsule, Rfl: 3    pantoprazole (ProtoNix) 40 mg EC tablet, Take 1 tablet (40 mg) by mouth once daily in the morning. Take before meals. Do not crush, chew, or split., Disp: , Rfl:     saccharomyces boulardii (Florastor) 250 mg capsule, Take 1 capsule (250 mg) by mouth once daily., Disp: , Rfl:     simvastatin (Zocor) 40 mg tablet, Take 1 tablet by mouth once daily in the evening., Disp: 90 tablet, Rfl: 3    triamcinolone (Kenalog) 0.1 % ointment, Apply 1 Application topically 2 times a day., Disp: , Rfl:     baclofen (Lioresal) 5 mg tablet, Take 1 tablet (5 mg) by mouth as needed at bedtime for muscle spasms., Disp: 30 tablet, Rfl: 0    cranberry extract 425 mg capsule, as directed Orally, Disp: , Rfl:     gabapentin (Neurontin) 300 mg capsule, TAKE ONE CAPSULE BY  MOUTH THREE TIMES A DAY. MAY TAKE 1 ADDTIONAL CAPSULE BY MOUTH AS NEEDED., Disp: 270 capsule, Rfl: 1    Physical Exam  Vitals and nursing note reviewed.   HENT:      Head: Normocephalic.      Nose: Nose normal.   Eyes:      Extraocular Movements: Extraocular movements intact.      Conjunctiva/sclera: Conjunctivae normal.      Pupils: Pupils are equal, round, and reactive to light.   Cardiovascular:      Rate and Rhythm: Normal rate and regular rhythm.   Pulmonary:      Effort: Pulmonary effort is normal.      Breath sounds: Normal breath sounds.   Musculoskeletal:         General: Tenderness present. No swelling, deformity or signs of injury.      Cervical back: No rigidity or tenderness.      Right lower leg: No edema.      Left lower leg: No edema.      Comments: No neck rigidity.  Full range of motion but with discomfort with lateral flexion.  Positive for Spurling test bilaterally.  Positive for paraspinal tenderness at the cervical region bilaterally at C5-C6, C6-C7.  With nonspecific radicular symptoms.  Positive for tenderness on palpation at the right shoulder joints.  Limited range of motion of the right arm/shoulder due to the pain.  Positive for tenderness on palpation at the lumbar region.  Limited exam due to patient's presentation.  She is sitting in wheelchair and has hard time getting up.  She ambulates at home with the use of cane or walker.  BUE 3-4/5, BLE 3-4/5.   Skin:     General: Skin is warm and dry.   Neurological:      General: No focal deficit present.      Mental Status: She is alert and oriented to person, place, and time.   Psychiatric:         Mood and Affect: Mood normal.         Behavior: Behavior normal.            Pain Management Panel  More data exists         Latest Ref Rng & Units 3/8/2024 10/27/2023   Pain Management Panel   Amphetamine Screen, Urine Presumptive Negative Presumptive Negative  Presumptive Negative    Barbiturate Screen, Urine Presumptive Negative Presumptive  Negative  Presumptive Negative    Benzodiazepines Screen, Urine Presumptive Negative Presumptive Negative  Presumptive Negative    Fentanyl Screen, Urine Presumptive Negative Presumptive Negative  Presumptive Negative    Methadone Screen, Urine Presumptive Negative Presumptive Negative  -      Details                      Assessment/Plan   Problem List Items Addressed This Visit             ICD-10-CM    Cervical radiculopathy M54.12    Relevant Medications    gabapentin (Neurontin) 300 mg capsule    Chronic right shoulder pain - Primary M25.511, G89.29    Relevant Medications    gabapentin (Neurontin) 300 mg capsule    Arthritis, shoulder region M19.019    Relevant Medications    gabapentin (Neurontin) 300 mg capsule    Facet arthropathy, lumbosacral M47.817    Relevant Medications    gabapentin (Neurontin) 300 mg capsule    Myofascial pain M79.18    Relevant Medications    baclofen (Lioresal) 5 mg tablet     Other Visit Diagnoses         Codes    Medication management     Z79.899    Relevant Orders    Opiate/Opioid/Benzo Prescription Compliance    Gabapentin,Urine    OOB Internal Tracking    Drug Screen 9 Panel, Blood with Reflex to Confirmation                Plan/Follow-up Instructions:    Continue with your prescribed medications.    Continue taking gabapentin 300 mg 3 times a day.  You may take extra 300 mg as needed.  Do not take sedating medications together.    I discontinued your tizanidine.  You may start taking baclofen as needed for muscle pain relief.  You may cut it in half if the 1 pill is too much for you.  Do not take sedating medications together.    I will see you for your follow-up visit in 2 months.        --------------  Disclaimer: This note was created using voice recognition software. It was not corrected for typographical or grammatical errors, inadvertent word insertion, or any unintended errors. Please feel free to contact me for clarification.  --------------      Marlen Waite DNP,  RICARDO FLORENCE   [facility]  Office #: [phone]  Fax # [phone]

## 2024-09-18 LAB
AMPHETAMINES SERPL QL SCN: NEGATIVE NG/ML
ANNOTATION COMMENT IMP: NORMAL
BARBITURATES SERPL QL SCN: NEGATIVE NG/ML
BENZODIAZ SERPL QL SCN: NEGATIVE NG/ML
BUPRENORPHINE SERPL-MCNC: NEGATIVE NG/ML
CANNABINOIDS SERPL QL SCN: NEGATIVE NG/ML
COCAINE SERPL QL SCN: NEGATIVE NG/ML
METHADONE SERPL QL SCN: NEGATIVE NG/ML
METHAMPHET SERPL QL: NEGATIVE NG/ML
OPIATES SERPL QL SCN: NEGATIVE NG/ML
OXYCODONE SERPL QL: NEGATIVE NG/ML
PCP SERPL QL SCN: NEGATIVE NG/ML

## 2024-10-01 ENCOUNTER — TELEPHONE (OUTPATIENT)
Dept: PRIMARY CARE | Facility: CLINIC | Age: 79
End: 2024-10-01
Payer: MEDICARE

## 2024-10-01 DIAGNOSIS — R26.2 DIFFICULTY WALKING: ICD-10-CM

## 2024-10-01 DIAGNOSIS — G62.9 POLYNEUROPATHY: Primary | ICD-10-CM

## 2024-10-18 ENCOUNTER — TELEPHONE (OUTPATIENT)
Dept: PRIMARY CARE | Facility: CLINIC | Age: 79
End: 2024-10-18
Payer: MEDICARE

## 2024-10-21 ENCOUNTER — OFFICE VISIT (OUTPATIENT)
Dept: PRIMARY CARE | Facility: CLINIC | Age: 79
End: 2024-10-21
Payer: MEDICARE

## 2024-10-21 ENCOUNTER — TELEPHONE (OUTPATIENT)
Dept: PRIMARY CARE | Facility: CLINIC | Age: 79
End: 2024-10-21

## 2024-10-21 ENCOUNTER — LAB (OUTPATIENT)
Dept: LAB | Facility: LAB | Age: 79
End: 2024-10-21
Payer: MEDICARE

## 2024-10-21 VITALS
OXYGEN SATURATION: 95 % | TEMPERATURE: 96.9 F | RESPIRATION RATE: 16 BRPM | BODY MASS INDEX: 40.85 KG/M2 | HEART RATE: 64 BPM | SYSTOLIC BLOOD PRESSURE: 126 MMHG | HEIGHT: 62 IN | WEIGHT: 222 LBS | DIASTOLIC BLOOD PRESSURE: 68 MMHG

## 2024-10-21 DIAGNOSIS — G62.9 POLYNEUROPATHY: ICD-10-CM

## 2024-10-21 DIAGNOSIS — R26.2 DIFFICULTY WALKING: ICD-10-CM

## 2024-10-21 DIAGNOSIS — M25.511 CHRONIC RIGHT SHOULDER PAIN: ICD-10-CM

## 2024-10-21 DIAGNOSIS — E11.319 DIABETIC RETINOPATHY ASSOCIATED WITH CONTROLLED TYPE 2 DIABETES MELLITUS (MULTI): ICD-10-CM

## 2024-10-21 DIAGNOSIS — E03.9 ACQUIRED HYPOTHYROIDISM: ICD-10-CM

## 2024-10-21 DIAGNOSIS — I10 ESSENTIAL HYPERTENSION: ICD-10-CM

## 2024-10-21 DIAGNOSIS — G89.29 CHRONIC RIGHT SHOULDER PAIN: ICD-10-CM

## 2024-10-21 DIAGNOSIS — K21.9 GASTROESOPHAGEAL REFLUX DISEASE WITHOUT ESOPHAGITIS: ICD-10-CM

## 2024-10-21 DIAGNOSIS — I10 ESSENTIAL HYPERTENSION: Primary | ICD-10-CM

## 2024-10-21 LAB
ANION GAP SERPL CALC-SCNC: 11 MMOL/L (ref 10–20)
BASOPHILS # BLD AUTO: 0.06 X10*3/UL (ref 0–0.1)
BASOPHILS NFR BLD AUTO: 0.7 %
BUN SERPL-MCNC: 24 MG/DL (ref 6–23)
CALCIUM SERPL-MCNC: 8.8 MG/DL (ref 8.6–10.3)
CHLORIDE SERPL-SCNC: 101 MMOL/L (ref 98–107)
CO2 SERPL-SCNC: 33 MMOL/L (ref 21–32)
CREAT SERPL-MCNC: 1.06 MG/DL (ref 0.5–1.05)
EGFRCR SERPLBLD CKD-EPI 2021: 53 ML/MIN/1.73M*2
EOSINOPHIL # BLD AUTO: 0.29 X10*3/UL (ref 0–0.4)
EOSINOPHIL NFR BLD AUTO: 3.2 %
ERYTHROCYTE [DISTWIDTH] IN BLOOD BY AUTOMATED COUNT: 12.7 % (ref 11.5–14.5)
EST. AVERAGE GLUCOSE BLD GHB EST-MCNC: 120 MG/DL
GLUCOSE SERPL-MCNC: 132 MG/DL (ref 74–99)
HBA1C MFR BLD: 5.8 %
HCT VFR BLD AUTO: 40.8 % (ref 36–46)
HGB BLD-MCNC: 13.2 G/DL (ref 12–16)
IMM GRANULOCYTES # BLD AUTO: 0.06 X10*3/UL (ref 0–0.5)
IMM GRANULOCYTES NFR BLD AUTO: 0.7 % (ref 0–0.9)
LYMPHOCYTES # BLD AUTO: 2.23 X10*3/UL (ref 0.8–3)
LYMPHOCYTES NFR BLD AUTO: 24.6 %
MCH RBC QN AUTO: 31.3 PG (ref 26–34)
MCHC RBC AUTO-ENTMCNC: 32.4 G/DL (ref 32–36)
MCV RBC AUTO: 97 FL (ref 80–100)
MONOCYTES # BLD AUTO: 0.59 X10*3/UL (ref 0.05–0.8)
MONOCYTES NFR BLD AUTO: 6.5 %
NEUTROPHILS # BLD AUTO: 5.83 X10*3/UL (ref 1.6–5.5)
NEUTROPHILS NFR BLD AUTO: 64.3 %
NRBC BLD-RTO: 0 /100 WBCS (ref 0–0)
PLATELET # BLD AUTO: 247 X10*3/UL (ref 150–450)
POTASSIUM SERPL-SCNC: 3.9 MMOL/L (ref 3.5–5.3)
RBC # BLD AUTO: 4.22 X10*6/UL (ref 4–5.2)
SODIUM SERPL-SCNC: 141 MMOL/L (ref 136–145)
TSH SERPL-ACNC: 2.06 MIU/L (ref 0.44–3.98)
WBC # BLD AUTO: 9.1 X10*3/UL (ref 4.4–11.3)

## 2024-10-21 PROCEDURE — 3074F SYST BP LT 130 MM HG: CPT | Performed by: NURSE PRACTITIONER

## 2024-10-21 PROCEDURE — 83036 HEMOGLOBIN GLYCOSYLATED A1C: CPT

## 2024-10-21 PROCEDURE — 36415 COLL VENOUS BLD VENIPUNCTURE: CPT

## 2024-10-21 PROCEDURE — 99349 HOME/RES VST EST MOD MDM 40: CPT | Performed by: NURSE PRACTITIONER

## 2024-10-21 PROCEDURE — 1159F MED LIST DOCD IN RCRD: CPT | Performed by: NURSE PRACTITIONER

## 2024-10-21 PROCEDURE — 1160F RVW MEDS BY RX/DR IN RCRD: CPT | Performed by: NURSE PRACTITIONER

## 2024-10-21 PROCEDURE — 1125F AMNT PAIN NOTED PAIN PRSNT: CPT | Performed by: NURSE PRACTITIONER

## 2024-10-21 PROCEDURE — 36415 COLL VENOUS BLD VENIPUNCTURE: CPT | Performed by: NURSE PRACTITIONER

## 2024-10-21 PROCEDURE — 3078F DIAST BP <80 MM HG: CPT | Performed by: NURSE PRACTITIONER

## 2024-10-21 PROCEDURE — 1036F TOBACCO NON-USER: CPT | Performed by: NURSE PRACTITIONER

## 2024-10-21 ASSESSMENT — PAIN SCALES - GENERAL: PAINLEVEL_OUTOF10: 2

## 2024-10-21 NOTE — TELEPHONE ENCOUNTER
Call placed to patient number as listed, [phone], appointment scheduled 1/24/25, 8:30am with Bessie Pozo NP [facility] .

## 2024-10-21 NOTE — PROGRESS NOTES
Subjective   Patient ID: Sara Zavala is a 79 y.o. female who presents for Follow-up (Routine 3 month follow up, labs).    Visit for 80 y/o female seen today in private home, alone for routine follow up of chronic medical conditions. PMHx of HTN, High Triglycerides, Hypothyroidism, DM, Retinopathy, GERD, Iron deficiency anemia, Polyneuropathy, Psoriatic Arthritis, Vitamin D Deficiency, difficulty ambulating. Pt is sitting on recliner this morning. She is alert, oriented, able to answer all questions without difficulty. Pt lives with her brother in a single story home. She does not drive anymore and endorses chronic pain with limited mobility making it difficult to get out for medical appointments. Pt does see her cardiologist and pain management specialist but is otherwise home bound. Pt can manage her own medications. She denies any issues with compliance. She is able to do her own dressing and toileting but requires assistance with meal preparation. Pt denies appetite changes or signs of weight loss. She has history of GERD and endorses occasional indigestion, managed with Pantoprazole. She endorses occasional difficulty swallowing. Pt will take smaller bites and portion sizes. Denies any issues with choking. She does not routinely see GI. Pt denies abdominal pain, nausea, vomiting. She endorses chronic constipation and takes stool softeners with improvement. Pt denies any urinary concerns. Denies difficulty sleeping.     Chronic pain- patient remains active with Dr. Minaya, pain management and Marlen Waite. Last follow up was 9/13/24. Pt endorses chronic pain, mostly in lower back, right shoulder and bilateral knees. Pain is a 2/10 when sitting in the chair but increases to 8/10 when standing/putting pressure on her joints. Pt takes Gabapentin and uses Voltaren gel with improvement. She was using Tizanidine as needed but felt too groggy with the medication and was started on Baclofen at HS PRN last month.  Pt reports taking 4 pills since it was ordered and notes improvement.      HTN- cardiologist is Dr. Young. Pt had a 2D echo on 10/14 and is scheduled for cardiology follow up tomorrow. Pt is due for a pacemaker check on 11/4. She denies headaches, chest pain, palpitations, increased shortness of breath, edema or signs of weight gain. Patient has a home BP Cuff and will occasionally monitor her BP.     DM- patient continues on Trulicity weekly. She monitors her glucose levels daily. Values range from . Pt denies dizziness, lightheadedness, s/sx of hypoglycemia. Podiatrist is Dr. Zee and she is planning to follow up before winter. Pt follows with her eye specialist Dr. Watts annually and Dr. Topete.      Home Visit:          Medically necessary due to: Illness or condition that results in activity lmitation or restriction that impacts the ability to leave home such as:, unsteady gait/poor balance         Current Outpatient Medications:     aspirin 81 mg EC tablet, Take 1 tablet (81 mg) by mouth once daily., Disp: , Rfl:     baclofen (Lioresal) 5 mg tablet, Take 1 tablet (5 mg) by mouth as needed at bedtime for muscle spasms., Disp: 30 tablet, Rfl: 0    blood sugar diagnostic (True Metrix Glucose Test Strip) strip, test twice daily as directed, Disp: 100 each, Rfl: 3    calcium citrate/vitamin D3 (CALCIUM CITRATE + D ORAL), as directed Orally, Disp: , Rfl:     clobetasol (Temovate) 0.05 % ointment, Apply 1 Application topically 2 times a day., Disp: , Rfl:     clotrimazole (Lotrimin) 1 % cream, APPLY 1 APPLICATION EXTERNALLY TWICE A DAY, Disp: , Rfl:     cranberry extract 425 mg capsule, as directed Orally, Disp: , Rfl:     cyanocobalamin (Vitamin B-12) 1,000 mcg tablet, Take 1 tablet (1,000 mcg) by mouth., Disp: , Rfl:     diclofenac sodium (Voltaren) 1 % gel gel, APPLY SPARINGLY TO THE AFFECTED AREA 2 TO 3 TIMES A DAY AS NEEDED FOR PAIN., Disp: , Rfl:     diphenoxylate-atropine (Lomotil) 2.5-0.025 mg tablet,  Take 1 tablet by mouth 4 times a day as needed., Disp: , Rfl:     docusate sodium (Colace) 100 mg capsule, Take 1 capsule (100 mg) by mouth once daily as needed., Disp: , Rfl:     dulaglutide (Trulicity) 0.75 mg/0.5 mL pen injector, INJECT 1 PEN-INJECTOR UNDER THE SKIN ONCE EVERY WEEK, Disp: 6 mL, Rfl: 3    escitalopram (Lexapro) 10 mg tablet, Take 1 tablet by mouth once daily., Disp: 90 tablet, Rfl: 3    gabapentin (Neurontin) 300 mg capsule, TAKE ONE CAPSULE BY MOUTH THREE TIMES A DAY. MAY TAKE 1 ADDTIONAL CAPSULE BY MOUTH AS NEEDED., Disp: 270 capsule, Rfl: 1    indapamide (Lozol) 2.5 mg tablet, Take 1 tablet by mouth once daily in the morning., Disp: 90 tablet, Rfl: 3    levothyroxine (Synthroid, Levoxyl) 50 mcg tablet, Take 1 tablet by mouth once daily in the morning on an empty stomach., Disp: 90 tablet, Rfl: 3    metoprolol tartrate (Lopressor) 25 mg tablet, Take 0.5 tablets (12.5 mg) by mouth 2 times a day., Disp: , Rfl:     multivitamin tablet, Take 1 tablet by mouth once daily., Disp: , Rfl:     omega-3 (Fish OiL) 60- mg capsule, Take 1 capsule (500 mg) by mouth 2 times a day., Disp: 90 capsule, Rfl: 3    pantoprazole (ProtoNix) 40 mg EC tablet, Take 1 tablet (40 mg) by mouth once daily in the morning. Take before meals. Do not crush, chew, or split., Disp: , Rfl:     saccharomyces boulardii (Florastor) 250 mg capsule, Take 1 capsule (250 mg) by mouth once daily., Disp: , Rfl:     simvastatin (Zocor) 40 mg tablet, Take 1 tablet by mouth once daily in the evening., Disp: 90 tablet, Rfl: 3    triamcinolone (Kenalog) 0.1 % ointment, Apply 1 Application topically 2 times a day., Disp: , Rfl:      Review of Systems  Constitutional: Negative for appetite change, chills, fever and unexpected weight change.   HENT: Positive for occasional difficulty swallowing.   Respiratory: Positive for shortness of breath on exertion. Negative for cough, wheezing.    Cardiovascular: Negative for chest pain, palpitations  "and leg swelling.   Gastrointestinal: Positive for occasional heartburn, constipation. Negative for abdominal pain, nausea and vomiting.   Endocrine: Positive for diabetes, thyroid disease  Genitourinary:  Negative for difficulty urinating.   Musculoskeletal:  Positive for arthralgias, unsteady gait, chronic lower back, right shoulder, bilateral knee pain  Neurological:  Negative for dizziness and light-headedness.   Psychiatric/Behavioral: Positive for depression     Objective   /68 (BP Location: Left arm, Patient Position: Sitting, BP Cuff Size: Adult)   Pulse 64   Temp 36.1 °C (96.9 °F) (Temporal)   Resp 16   Ht 1.562 m (5' 1.5\")   Wt 101 kg (222 lb)   SpO2 95%   BMI 41.27 kg/m²     Physical Exam  Constitutional:       General: She is not in acute distress.     Appearance: Normal appearance. She is overweight      Comments: Alert, talkative. Sitting in recliner.   HENT:      Head: Normocephalic and atraumatic.      Nose: Nose normal.      Mouth/Throat:      Mouth: Mucous membranes are moist.      Pharynx: Oropharynx is clear.   Eyes:      Extraocular Movements: Extraocular movements intact.      Pupils: Pupils are equal, round, and reactive to light.      Comments: wearing glasses   Cardiovascular:      Rate and Rhythm: Normal rate and regular rhythm.      Heart sounds: Normal heart sounds. No murmur heard.     No friction rub. No gallop.   Pulmonary:      Effort: Pulmonary effort is normal. No respiratory distress.      Breath sounds: Normal breath sounds. No wheezing, rhonchi or rales.   Abdominal:      General: Bowel sounds are normal. There is no distension.      Palpations: Abdomen is soft.      Comments: large hernia present    Musculoskeletal:         General: modest deformity of bilateral knees      Cervical back: Neck supple.      Right lower leg: No edema.      Left lower leg: No edema.   Skin:     General: Skin is warm and dry.   Neurological:      General: No focal deficit present.      " Mental Status: She is alert and oriented to person, place, and time.      Motor: generalized weakness      Gait: Unsteady    Psychiatric:         Mood and Affect: Mood normal.         Behavior: Behavior normal.     Assessment/Plan   Diagnoses and all orders for this visit:  Essential hypertension  -     CBC and Auto Differential; Future  -     Basic metabolic panel; Future  -chronic, vitals stable on exam  -continue aspirin, metoprolol, indapamide   -follow up with cardiology tomorrow as scheduled     Diabetic retinopathy associated with controlled type 2 diabetes mellitus (Multi)  -     Hemoglobin A1c; Future  -chronic, stable  -continue to monitor BG levels daily   -continue Trulicity     Gastroesophageal reflux disease without esophagitis  -chronic, stable, does endorse occasional indigestion   -continue Pantoprazole     Acquired hypothyroidism  -     Tsh With Reflex To Free T4 If Abnormal; Future  -chronic, managed with Levothyroxine     Polyneuropathy  Chronic right shoulder pain  -chronic, remains active with pain management   -continue Gabapentin, Voltaren gel/Baclofen as needed    Difficulty walking  -chronic, furniture walks in the home or uses her cane   -continue to use walker if leaving home  -continue safety measures and supportive care     Routine labs obtained in home- CBC, BMP, A1c, TSH level- pt tolerated well        LUDIVINA Vail-CNP

## 2024-10-25 ENCOUNTER — TELEPHONE (OUTPATIENT)
Dept: PRIMARY CARE | Facility: CLINIC | Age: 79
End: 2024-10-25
Payer: MEDICARE

## 2024-10-25 DIAGNOSIS — R26.2 DIFFICULTY WALKING: ICD-10-CM

## 2024-10-25 DIAGNOSIS — M25.511 CHRONIC RIGHT SHOULDER PAIN: ICD-10-CM

## 2024-10-25 DIAGNOSIS — G62.9 POLYNEUROPATHY: Primary | ICD-10-CM

## 2024-10-25 DIAGNOSIS — G89.29 CHRONIC RIGHT SHOULDER PAIN: ICD-10-CM

## 2024-10-31 PROCEDURE — G0180 MD CERTIFICATION HHA PATIENT: HCPCS | Performed by: NURSE PRACTITIONER

## 2024-11-15 ENCOUNTER — APPOINTMENT (OUTPATIENT)
Dept: PAIN MEDICINE | Facility: HOSPITAL | Age: 79
End: 2024-11-15
Payer: MEDICARE

## 2025-01-02 ENCOUNTER — TELEPHONE (OUTPATIENT)
Dept: PRIMARY CARE | Facility: CLINIC | Age: 80
End: 2025-01-02
Payer: MEDICARE

## 2025-01-02 DIAGNOSIS — R30.0 DYSURIA: Primary | ICD-10-CM

## 2025-01-03 RX ORDER — NITROFURANTOIN 25; 75 MG/1; MG/1
100 CAPSULE ORAL 2 TIMES DAILY
Qty: 10 CAPSULE | Refills: 0 | Status: SHIPPED | OUTPATIENT
Start: 2025-01-03 | End: 2025-01-08

## 2025-01-03 NOTE — TELEPHONE ENCOUNTER
Spoke with patient. Given instructions on antibiotic and to increase water intake as written. Expressed understanding.

## 2025-01-03 NOTE — TELEPHONE ENCOUNTER
Patient returned call today. She has a history of UTI's. She is having frequency, urgency, bladder discomfort, with odor to urine. She does not have a way to get a sample to the lab. She has taken Macrobid in the past. Pharmacy is [facility] in [place].

## 2025-01-13 DIAGNOSIS — E78.1 HIGH TRIGLYCERIDES: ICD-10-CM

## 2025-01-13 RX ORDER — ASPIRIN 325 MG
1 TABLET, DELAYED RELEASE (ENTERIC COATED) ORAL 2 TIMES DAILY
Qty: 180 CAPSULE | Refills: 0 | Status: SHIPPED | OUTPATIENT
Start: 2025-01-13

## 2025-01-14 DIAGNOSIS — E11.319 DIABETIC RETINOPATHY ASSOCIATED WITH CONTROLLED TYPE 2 DIABETES MELLITUS (MULTI): ICD-10-CM

## 2025-01-14 RX ORDER — DULAGLUTIDE 0.75 MG/.5ML
INJECTION, SOLUTION SUBCUTANEOUS
Qty: 6 ML | Refills: 0 | Status: SHIPPED | OUTPATIENT
Start: 2025-01-14

## 2025-01-23 ENCOUNTER — TELEPHONE (OUTPATIENT)
Dept: PRIMARY CARE | Facility: CLINIC | Age: 80
End: 2025-01-23
Payer: MEDICARE

## 2025-01-24 ENCOUNTER — OFFICE VISIT (OUTPATIENT)
Dept: PRIMARY CARE | Facility: CLINIC | Age: 80
End: 2025-01-24
Payer: MEDICARE

## 2025-01-24 DIAGNOSIS — G89.29 CHRONIC RIGHT SHOULDER PAIN: ICD-10-CM

## 2025-01-24 DIAGNOSIS — I10 ESSENTIAL HYPERTENSION: Primary | ICD-10-CM

## 2025-01-24 DIAGNOSIS — M25.562 CHRONIC PAIN OF BOTH KNEES: ICD-10-CM

## 2025-01-24 DIAGNOSIS — M25.511 CHRONIC RIGHT SHOULDER PAIN: ICD-10-CM

## 2025-01-24 DIAGNOSIS — R26.2 DIFFICULTY WALKING: ICD-10-CM

## 2025-01-24 DIAGNOSIS — G62.9 POLYNEUROPATHY: ICD-10-CM

## 2025-01-24 DIAGNOSIS — E03.9 ACQUIRED HYPOTHYROIDISM: ICD-10-CM

## 2025-01-24 DIAGNOSIS — E78.1 HIGH TRIGLYCERIDES: ICD-10-CM

## 2025-01-24 DIAGNOSIS — L40.50 PSORIATIC ARTHRITIS (MULTI): ICD-10-CM

## 2025-01-24 DIAGNOSIS — K21.9 GASTROESOPHAGEAL REFLUX DISEASE WITHOUT ESOPHAGITIS: ICD-10-CM

## 2025-01-24 DIAGNOSIS — G89.29 CHRONIC PAIN OF BOTH KNEES: ICD-10-CM

## 2025-01-24 DIAGNOSIS — M25.561 CHRONIC PAIN OF BOTH KNEES: ICD-10-CM

## 2025-01-24 DIAGNOSIS — E11.42 TYPE 2 DIABETES MELLITUS WITH DIABETIC POLYNEUROPATHY, WITHOUT LONG-TERM CURRENT USE OF INSULIN: ICD-10-CM

## 2025-01-24 DIAGNOSIS — I44.0 HEART BLOCK AV FIRST DEGREE: ICD-10-CM

## 2025-01-24 NOTE — PROGRESS NOTES
Subjective   Patient ID: Sara Zavala is a 79 y.o. female who presents for Follow-up (Chronic medical conditions).    HPI   Sara Guevara is seen in her private home today for a routine follow up of chronic medical conditions. She is alert, oriented to person, place, time, and situation. She is able to answer all questions regarding her health.     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 She continues to live in her family home with her brother. She is independent with her personal hygiene and eating. Her brother does all of the shopping, home maintenance, cooking, and cleaning. Sara Guevara is ambulatory with a cane or walker, admitting that she does not often use either in the home, rather, she uses the wall or furniture for support. She will use a wheelchair for longer distances when out for appointments. She reports that she has not fallen.She was discharged from [facility] PT/OT services at the end of December. She is independent with medication management. She does not drive and rarely leaves the home with the exception of necessary medical appointments. She does not have any concerns this visit. She had a UTI in December, completed course of nitrofurantoin.  No further urinary issues.     Chronic pain - reports pain in low back, bilateral knees and right shoulder. Follows with pain management services, has received steroid injection in shoulder in the past with good effect. She canceled her November 2024 appt d/t bad weather and has not rescheduled yet. She reports that the gabapentin, Voltaren gel, and night time baclofen is effective for pain management. She tells me that because of her RUE pain, she has learned to write with her left hand and sent out over 100 [employer] cards this year. Reports that she has been able  to get to the kitchen to help her brother with the dishes a few times in the last month.     HTN/AVHB -  s/p Metronic [place] DDD pacer, 60 -130 (10/25/2019). TTE in 10/2025 with EF 60%. Follows with Dr. Young, last visit 10/22/2025, no changes in medications, next visit in 6 months. She denies headaches, chest pain, palpitations, increased shortness of breath, edema or signs of weight gain. Patient has a home BP Cuff and will occasionally monitor her BP.     DM- patient continues on Trulicity weekly. She monitors her glucose levels daily. 30 day BG average is 116. Denies dizziness, lightheadedness, s/sx of hypoglycemia. Podiatrist is Dr. Zee. Pt follows with her eye specialist Dr. Watts annually and Dr. Topete.     Home Visit: Medically necessary due to unsteady gait/poor balance, and Illness or condition that results in activity limitation or restriction that impacts the ability to leave home such as: equipment and /or human assistance needed to safely leave the home, patient has limited support systems to help the patient attend office visits, the office visit would require excessive physical effort or pain for the patient.       Current Outpatient Medications:     aspirin 81 mg EC tablet, Take 1 tablet (81 mg) by mouth once daily., Disp: , Rfl:     baclofen (Lioresal) 5 mg tablet, Take 1 tablet (5 mg) by mouth as needed at bedtime for muscle spasms., Disp: 30 tablet, Rfl: 0    blood sugar diagnostic (True Metrix Glucose Test Strip) strip, test twice daily as directed, Disp: 100 each, Rfl: 3    calcium citrate/vitamin D3 (CALCIUM CITRATE + D ORAL), as directed Orally, Disp: , Rfl:     clobetasol (Temovate) 0.05 % ointment, Apply 1 Application topically 2 times a day., Disp: , Rfl:     clotrimazole (Lotrimin) 1 % cream, APPLY 1 APPLICATION EXTERNALLY TWICE A DAY, Disp: , Rfl:     cranberry extract 425 mg capsule, as directed Orally, Disp: , Rfl:     cyanocobalamin (Vitamin B-12) 1,000 mcg tablet, Take 1 tablet  (1,000 mcg) by mouth., Disp: , Rfl:     diclofenac sodium (Voltaren) 1 % gel gel, APPLY SPARINGLY TO THE AFFECTED AREA 2 TO 3 TIMES A DAY AS NEEDED FOR PAIN., Disp: , Rfl:     diphenoxylate-atropine (Lomotil) 2.5-0.025 mg tablet, Take 1 tablet by mouth 4 times a day as needed., Disp: , Rfl:     docusate sodium (Colace) 100 mg capsule, Take 1 capsule (100 mg) by mouth once daily as needed., Disp: , Rfl:     escitalopram (Lexapro) 10 mg tablet, Take 1 tablet by mouth once daily., Disp: 90 tablet, Rfl: 3    gabapentin (Neurontin) 300 mg capsule, TAKE ONE CAPSULE BY MOUTH THREE TIMES A DAY. MAY TAKE 1 ADDTIONAL CAPSULE BY MOUTH AS NEEDED., Disp: 270 capsule, Rfl: 1    indapamide (Lozol) 2.5 mg tablet, Take 1 tablet by mouth once daily in the morning., Disp: 90 tablet, Rfl: 3    levothyroxine (Synthroid, Levoxyl) 50 mcg tablet, Take 1 tablet by mouth once daily in the morning on an empty stomach., Disp: 90 tablet, Rfl: 3    metoprolol tartrate (Lopressor) 25 mg tablet, Take 0.5 tablets (12.5 mg) by mouth 2 times a day., Disp: , Rfl:     multivitamin tablet, Take 1 tablet by mouth once daily., Disp: , Rfl:     omega-3 (Fish Oil) 60- mg capsule, TAKE ONE CAPSULE BY MOUTH TWO TIMES A DAY, Disp: 180 capsule, Rfl: 0    pantoprazole (ProtoNix) 40 mg EC tablet, Take 1 tablet (40 mg) by mouth once daily in the morning. Take before meals. Do not crush, chew, or split., Disp: , Rfl:     saccharomyces boulardii (Florastor) 250 mg capsule, Take 1 capsule (250 mg) by mouth once daily., Disp: , Rfl:     simvastatin (Zocor) 40 mg tablet, Take 1 tablet by mouth once daily in the evening., Disp: 90 tablet, Rfl: 3    triamcinolone (Kenalog) 0.1 % ointment, Apply 1 Application topically 2 times a day., Disp: , Rfl:     Trulicity 0.75 mg/0.5 mL pen injector, Inject 1 pen injector under the skin once every week., Disp: 6 mL, Rfl: 0     Review of Systems   Constitutional:  Negative for activity change, appetite change, chills,  diaphoresis, fatigue, fever and unexpected weight change.   HENT:  Negative for congestion, dental problem, hearing loss, mouth sores, nosebleeds, postnasal drip, rhinorrhea, sinus pressure, sinus pain, sneezing, sore throat and trouble swallowing.    Eyes:  Negative for visual disturbance.        Wears glasses.   Respiratory:  Negative for cough, choking, chest tightness, shortness of breath and wheezing.    Cardiovascular:  Negative for chest pain, palpitations and leg swelling.   Gastrointestinal:  Positive for constipation, diarrhea and nausea. Negative for abdominal distention, abdominal pain, anal bleeding, blood in stool, rectal pain and vomiting.        History of bariatric surgery. Will become nauseated if she eats or drinks too much at one time, although states no recent nausea. Intermittent issues with both diarrhea and constipation, takes stool softener, probiotic, fiber, and daily MV.    Endocrine: Negative for polydipsia, polyphagia and polyuria.   Genitourinary:  Negative for difficulty urinating, dysuria, flank pain, frequency, hematuria, pelvic pain and urgency.   Musculoskeletal:  Positive for arthralgias, back pain and gait problem.        Chronic back and bilateral knee pain, at baseline.   Skin:  Negative for rash and wound.   Neurological:  Positive for tremors. Negative for dizziness, seizures, syncope, facial asymmetry, speech difficulty, light-headedness and headaches.        Right hand tremor, mild, at baseline.  BUE and BLE with neuropathy.   Hematological:  Does not bruise/bleed easily.   Psychiatric/Behavioral:  Negative for agitation, confusion, decreased concentration, dysphoric mood and sleep disturbance. The patient is not nervous/anxious.        Objective   /65   Pulse 60   Temp 36.6 °C (97.8 °F)   Resp 17   SpO2 93% Comment: room air  Lab Results   Component Value Date    HGBA1C 5.8 (H) 10/21/2024      Lab Results   Component Value Date    WBC 9.1 10/21/2024    HGB 13.2  10/21/2024    HCT 40.8 10/21/2024    MCV 97 10/21/2024     10/21/2024      Lab Results   Component Value Date    GLUCOSE 132 (H) 10/21/2024    CALCIUM 8.8 10/21/2024     10/21/2024    K 3.9 10/21/2024    CO2 33 (H) 10/21/2024     10/21/2024    BUN 24 (H) 10/21/2024    CREATININE 1.06 (H) 10/21/2024      Lab Results   Component Value Date    INR 1.0 10/25/2019    PROTIME 11.0 10/25/2019          Physical Exam  Constitutional:       General: She is not in acute distress.     Appearance: She is obese. She is not toxic-appearing.      Comments: Chronically ill appearing. Well-groomed, well-nourished. Alert.   HENT:      Nose: No congestion or rhinorrhea.      Mouth/Throat:      Mouth: Mucous membranes are moist.      Pharynx: Oropharynx is clear. No oropharyngeal exudate or posterior oropharyngeal erythema.   Eyes:      General: No scleral icterus.     Extraocular Movements: Extraocular movements intact.      Conjunctiva/sclera: Conjunctivae normal.      Pupils: Pupils are equal, round, and reactive to light.   Cardiovascular:      Rate and Rhythm: Normal rate and regular rhythm.      Pulses: Normal pulses.      Heart sounds: Normal heart sounds. No murmur heard.     Comments: PPM @ 60 bpm  Pulmonary:      Effort: Pulmonary effort is normal. No respiratory distress.      Breath sounds: No wheezing or rales.   Abdominal:      General: Bowel sounds are normal. There is no distension.      Palpations: Abdomen is soft.      Tenderness: There is no abdominal tenderness. There is no guarding or rebound.      Hernia: A hernia is present.      Comments: Abdominal hernias are reducible.    Genitourinary:     Comments: Did not observe area.  Musculoskeletal:      Cervical back: Neck supple.      Right lower leg: No edema.      Left lower leg: No edema.      Comments: Bilateral knees with decreased ROM.   Skin:     General: Skin is warm and dry.      Capillary Refill: Capillary refill takes less than 2 seconds.  "     Coloration: Skin is not jaundiced.      Findings: No bruising, lesion or rash.   Neurological:      General: No focal deficit present.      Mental Status: She is alert and oriented to person, place, and time.      Comments: Clear and coherent speech, MAEx4 with equal strength: BUE 5/5, BLE 4/5  Right hand tremor noted with movement.   Psychiatric:         Mood and Affect: Mood normal.         Behavior: Behavior normal.         Thought Content: Thought content normal.         Judgment: Judgment normal.         Assessment/Plan   Musculoskeletal and Injuries  Chronic Pain - bilateral knee, low back, and right shoulder pain, also with polyneuropathy. Follows with pain management services, pain is controlled. Recently discharged from PT/OT services.  -continue gabapentin, Voltaren gel, and baclofen  -continue prescribed exercises independently  -continue safety interventions and fall risk prevention strategies    Endocrine/Metabolic  DM2 - chronic, managed.  Lab Results   Component Value Date    HGBA1C 5.8 (H) 10/21/2024   -continue trulicity  -routinely monitor a1c and renal function    Hypothyroidism - chronic, managed.  Lab Results   Component Value Date    TSH 2.06 10/21/2024   -continue levothyroxine 50 mcg  -routinely monitor tsh levels    Cardiac and Vasculature  HTN/HLD/HB -  s/p Metronic [place] DDD pacer, 60 -130 (10/25/2019). TTE in 10/2025 with EF 60%. Follows with Dr. Young. Euvolemic this visit, stable BP.   Lab Results   Component Value Date    CHOL 132 04/22/2024    CHOL 151 10/23/2023    CHOL 160 04/21/2023     Lab Results   Component Value Date    HDL 37.1 04/22/2024    HDL 40.3 10/23/2023    HDL 37 (L) 04/21/2023     Lab Results   Component Value Date    LDLCALC 60 04/22/2024    LDLCALC 66 10/23/2023    LDLCALC 46 (L) 04/21/2023     Lab Results   Component Value Date    TRIG 177 (H) 04/22/2024    TRIG 225 (H) 10/23/2023    TRIG 386 (H) 04/21/2023     No components found for: \"CHOLHDL\"  -continue " aspirin 81 mg, indapamide 2.5 mg, metoprolol tartrate 12.5 mg BID, omega-3, and simvastatin 40 mg   -heart healthy diet  -routinely monitor renal function and lipids    Gastrointestinal and Abdominal  GERD - history of bariatric surgery, chronic, managed.  -continue pantoprazole 40 mg and probiotic    Patient is stable, will continue with routine and as needed House Call NP visits as patient has impaired mobility and does not drive. Leaving the home is difficult.            Bessie Pozo, APRYAJAIRA-CNP

## 2025-01-30 VITALS
SYSTOLIC BLOOD PRESSURE: 121 MMHG | DIASTOLIC BLOOD PRESSURE: 65 MMHG | OXYGEN SATURATION: 93 % | RESPIRATION RATE: 17 BRPM | TEMPERATURE: 97.8 F | HEART RATE: 60 BPM

## 2025-01-30 ASSESSMENT — ENCOUNTER SYMPTOMS
BACK PAIN: 1
WOUND: 0
TREMORS: 1
DIZZINESS: 0
PALPITATIONS: 0
CHILLS: 0
RHINORRHEA: 0
DECREASED CONCENTRATION: 0
DIAPHORESIS: 0
SHORTNESS OF BREATH: 0
CONFUSION: 0
SINUS PRESSURE: 0
FEVER: 0
NERVOUS/ANXIOUS: 0
LIGHT-HEADEDNESS: 0
CONSTIPATION: 1
VOMITING: 0
POLYDIPSIA: 0
SPEECH DIFFICULTY: 0
APPETITE CHANGE: 0
FATIGUE: 0
FREQUENCY: 0
DIARRHEA: 1
HEADACHES: 0
AGITATION: 0
ABDOMINAL DISTENTION: 0
ACTIVITY CHANGE: 0
SEIZURES: 0
NAUSEA: 1
COUGH: 0
WHEEZING: 0
ARTHRALGIAS: 1
BRUISES/BLEEDS EASILY: 0
BLOOD IN STOOL: 0
RECTAL PAIN: 0
FACIAL ASYMMETRY: 0
TROUBLE SWALLOWING: 0
SINUS PAIN: 0
UNEXPECTED WEIGHT CHANGE: 0
CHEST TIGHTNESS: 0
POLYPHAGIA: 0
SLEEP DISTURBANCE: 0
CHOKING: 0
DIFFICULTY URINATING: 0
SORE THROAT: 0
HEMATURIA: 0
ANAL BLEEDING: 0
DYSPHORIC MOOD: 0
DYSURIA: 0
ABDOMINAL PAIN: 0
FLANK PAIN: 0

## 2025-01-31 ENCOUNTER — TELEPHONE (OUTPATIENT)
Dept: PRIMARY CARE | Facility: CLINIC | Age: 80
End: 2025-01-31
Payer: MEDICARE

## 2025-01-31 PROBLEM — M25.562 CHRONIC PAIN OF BOTH KNEES: Status: ACTIVE | Noted: 2025-01-31

## 2025-01-31 PROBLEM — G89.29 CHRONIC PAIN OF BOTH KNEES: Status: ACTIVE | Noted: 2025-01-31

## 2025-01-31 PROBLEM — M25.561 CHRONIC PAIN OF BOTH KNEES: Status: ACTIVE | Noted: 2025-01-31

## 2025-01-31 PROBLEM — I44.0 HEART BLOCK AV FIRST DEGREE: Status: ACTIVE | Noted: 2025-01-31

## 2025-02-01 NOTE — ASSESSMENT & PLAN NOTE
Chronic Pain - bilateral knee, low back, and right shoulder pain, also with polyneuropathy. Follows with pain management services, pain is controlled. Recently discharged from PT/OT services.  -continue gabapentin, Voltaren gel, and baclofen  -continue prescribed exercises independently  -continue safety interventions and fall risk prevention strategies

## 2025-02-01 NOTE — ASSESSMENT & PLAN NOTE
GERD - history of bariatric surgery, chronic, managed.  -continue pantoprazole 40 mg and probiotic

## 2025-02-01 NOTE — ASSESSMENT & PLAN NOTE
DM2 - chronic, managed.  Lab Results   Component Value Date    HGBA1C 5.8 (H) 10/21/2024   -continue trulicity  -routinely monitor a1c and renal function    Hypothyroidism - chronic, managed.  Lab Results   Component Value Date    TSH 2.06 10/21/2024   -continue levothyroxine 50 mcg  -routinely monitor tsh levels

## 2025-02-01 NOTE — ASSESSMENT & PLAN NOTE
"HTN/HLD/HB -  s/p Metronic [place] DDD pacer, 60 -130 (10/25/2019). TTE in 10/2025 with EF 60%. Follows with Dr. Young. Euvolemic this visit, stable BP.   Lab Results   Component Value Date    CHOL 132 04/22/2024    CHOL 151 10/23/2023    CHOL 160 04/21/2023     Lab Results   Component Value Date    HDL 37.1 04/22/2024    HDL 40.3 10/23/2023    HDL 37 (L) 04/21/2023     Lab Results   Component Value Date    LDLCALC 60 04/22/2024    LDLCALC 66 10/23/2023    LDLCALC 46 (L) 04/21/2023     Lab Results   Component Value Date    TRIG 177 (H) 04/22/2024    TRIG 225 (H) 10/23/2023    TRIG 386 (H) 04/21/2023     No components found for: \"CHOLHDL\"  -continue aspirin 81 mg, indapamide 2.5 mg, metoprolol tartrate 12.5 mg BID, omega-3, and simvastatin 40 mg   -heart healthy diet  -routinely monitor renal function and lipids  "

## 2025-02-03 NOTE — TELEPHONE ENCOUNTER
Appointment scheduled 4/15/25 with SUSANNE Breen NP 8:30 am via phone call with patient  and this nurse.

## 2025-02-07 DIAGNOSIS — L40.9 PSORIASIS: Primary | ICD-10-CM

## 2025-02-10 RX ORDER — CLOTRIMAZOLE 1 %
CREAM (GRAM) TOPICAL
Qty: 60 G | Refills: 0 | Status: SHIPPED | OUTPATIENT
Start: 2025-02-10

## 2025-03-14 ENCOUNTER — OFFICE VISIT (OUTPATIENT)
Dept: PAIN MEDICINE | Facility: HOSPITAL | Age: 80
End: 2025-03-14
Payer: MEDICARE

## 2025-03-14 VITALS
BODY MASS INDEX: 41.22 KG/M2 | WEIGHT: 224 LBS | HEART RATE: 60 BPM | RESPIRATION RATE: 14 BRPM | TEMPERATURE: 98 F | OXYGEN SATURATION: 95 % | DIASTOLIC BLOOD PRESSURE: 50 MMHG | SYSTOLIC BLOOD PRESSURE: 108 MMHG | HEIGHT: 62 IN

## 2025-03-14 DIAGNOSIS — M25.511 CHRONIC RIGHT SHOULDER PAIN: Primary | ICD-10-CM

## 2025-03-14 DIAGNOSIS — G89.29 CHRONIC RIGHT SHOULDER PAIN: Primary | ICD-10-CM

## 2025-03-14 DIAGNOSIS — M19.019 ARTHRITIS, SHOULDER REGION: ICD-10-CM

## 2025-03-14 DIAGNOSIS — Z79.899 MEDICATION MANAGEMENT: ICD-10-CM

## 2025-03-14 DIAGNOSIS — M54.12 CERVICAL RADICULOPATHY: ICD-10-CM

## 2025-03-14 DIAGNOSIS — M47.812 FACET ARTHROPATHY, CERVICAL: ICD-10-CM

## 2025-03-14 DIAGNOSIS — M79.18 MYOFASCIAL PAIN: ICD-10-CM

## 2025-03-14 PROCEDURE — 99214 OFFICE O/P EST MOD 30 MIN: CPT | Performed by: NURSE PRACTITIONER

## 2025-03-14 RX ORDER — BACLOFEN 5 MG/1
5 TABLET ORAL 2 TIMES DAILY PRN
Qty: 180 TABLET | Refills: 0 | Status: SHIPPED | OUTPATIENT
Start: 2025-03-14

## 2025-03-14 RX ORDER — GABAPENTIN 300 MG/1
300 CAPSULE ORAL 3 TIMES DAILY
Qty: 270 CAPSULE | Refills: 0 | Status: SHIPPED | OUTPATIENT
Start: 2025-03-14

## 2025-03-14 ASSESSMENT — ENCOUNTER SYMPTOMS
MYALGIAS: 1
CONSTITUTIONAL NEGATIVE: 1
ENDOCRINE NEGATIVE: 1
GASTROINTESTINAL NEGATIVE: 1
ARTHRALGIAS: 1
ALLERGIC/IMMUNOLOGIC NEGATIVE: 1
JOINT SWELLING: 0
CARDIOVASCULAR NEGATIVE: 1
EYES NEGATIVE: 1
NECK PAIN: 1
PSYCHIATRIC NEGATIVE: 1
NEUROLOGICAL NEGATIVE: 1
RESPIRATORY NEGATIVE: 1
NECK STIFFNESS: 0

## 2025-03-14 ASSESSMENT — PAIN SCALES - GENERAL: PAINLEVEL_OUTOF10: 5

## 2025-03-14 NOTE — H&P (VIEW-ONLY)
History Of Present Illness  Sara Zavala is a pleasant 79 y.o. female who is here for follow-up visit.  Patient presents in a wheelchair.  She ambulates at home with the use of cane.  She arrives with her brother who was also being seen.    Patient continues to have chronic right shoulder pain.  She also has neck pain but the shoulder bothers her more.  She rates her pain as 5-6 out of 10.  Pain can be constant or comes and goes depending on her mobilities.  Pain increases with motion.  She describes her pain as burning, pressure, spastic, stabbing and throbbing kind of pain.  Patient reports that her right shoulder pain is worsened when she lays on this side.  She reports it wakes her up from a sound sleep.  She continues to have stinging, pins and needle sensation to her arm.  She had cervical DEE in the past with no relief.  She denies increasing arm weakness, weakness in  or dropping objects.  She denies increasing leg weakness or change in balance.  She denies bowel or bladder incontinence.  She denies recent falls, injuries, or ER visits.  There has been no changes since she was last seen.    Patient continues to take gabapentin 300 mg 3 times a day.  She takes baclofen as needed for muscle pain relief.  She takes Tylenol on occasion.  She denies side effects to her medications.    I reviewed previous notes, labs and imaging.  I discussed the plan of care including pharmacologic and joint interventional procedure. Patient had right shoulder injection done on 6/14/2024 that worked well.  She would like this repeated.  This is a therapeutic procedure that is done under fluoroscopy.  Questions were started during this encounter.      The patient was counseled regarding diagnostic results, instructions for management, risk factor reductions, risks and benefits of treatment options and importance of compliance with treatment.    ---------------------  PROCEDURES:    6/14/2024: Right shoulder  injection  3/26/2024: Right shoulder injection  8/29/2023: Right shoulder injection  6/27/2023: Right shoulder injection  12/6/2022: Cervical DEE #1.  No relief  -------------    OARRS:  LUDIVINA Bray-CNP, DNP on 3/14/2025 11:23 AM  I have personally reviewed the OARRS report for Sara Zavala. I have considered the risks of abuse, dependence, addiction and diversion    Past Medical History  She has a past medical history of Abdominal hernia, Anemia, iron deficiency, Anxiety disorder, Atherosclerotic heart disease of native coronary artery without angina pectoris, Chronic back pain, Diabetes (Multi), Diabetic neuropathy (Multi), Diabetic retinopathy (Multi), Enlarged uterus, Hyperlipidemia, Hypertension, Hypothyroidism, Left ventricular ejection fraction greater than 40% (07/22/2019), Low back pain, Morbid obesity (Multi), Other specified diabetes mellitus without complications, Peptic ulcer disease, Personal history of other diseases of the circulatory system, Psoriasis, Vitamin B12 deficiency, and Vitamin D deficiency.    Surgical History  Past Surgical History:   Procedure Laterality Date    APPENDECTOMY  2005    CARDIAC CATHETERIZATION  05/03/2017    COLONOSCOPY  08/04/2010    COLONOSCOPY  07/31/2015    EPIDURAL BLOCK INJECTION  09/21/2010    EPIDURAL BLOCK INJECTION  10/2013    Dr. Cote    OTHER SURGICAL HISTORY      Carpal tunnel surgery    OTHER SURGICAL HISTORY      Phacoemulsification of cataract and insertion of intraocular lens    OTHER SURGICAL HISTORY  2004    Bariatric surgery - gastric bypass    OTHER SURGICAL HISTORY  1961    Incision and drainage of pilonidal cyst, also 1962    OTHER SURGICAL HISTORY  10/26/2019    Pacemaker insertion - carmen Florez    OTHER SURGICAL HISTORY  2005    Cholecystectomy laparoscopic    PILONIDAL CYST DRAINAGE  1961    PILONIDAL CYST DRAINAGE  1962    SINUS SURGERY  10/2013    sack of bacteria from left sinus (removed)    TOE SURGERY  1985    4  toe nails removed    UMBILICAL HERNIA REPAIR  1986        Social History  She reports that she has never smoked. She has never been exposed to tobacco smoke. She has never used smokeless tobacco. She reports that she does not currently use alcohol. She reports that she does not use drugs.    Family History  Family History   Problem Relation Name Age of Onset    Diabetes Mother      Heart disease Mother      Hypertension Mother      Heart disease Father      Stroke Father      Hypertension Sister      Mental illness Brother      Diabetes Brother      Hypertension Brother          Allergies  Ace inhibitors, Sulfamethoxazole-trimethoprim, and Tizanidine    Medications    Current Outpatient Medications:     aspirin 81 mg EC tablet, Take 1 tablet (81 mg) by mouth once daily., Disp: , Rfl:     baclofen (Lioresal) 5 mg tablet, Take 1 tablet (5 mg) by mouth as needed at bedtime for muscle spasms., Disp: 30 tablet, Rfl: 0    blood sugar diagnostic (True Metrix Glucose Test Strip) strip, test twice daily as directed, Disp: 100 each, Rfl: 3    calcium citrate/vitamin D3 (CALCIUM CITRATE + D ORAL), as directed Orally, Disp: , Rfl:     clobetasol (Temovate) 0.05 % ointment, Apply 1 Application topically 2 times a day., Disp: , Rfl:     clotrimazole (Lotrimin) 1 % cream, APPLY 1 APPLICATION EXTERNALLY TWICE A DAY, Disp: 60 g, Rfl: 0    cranberry extract 425 mg capsule, as directed Orally, Disp: , Rfl:     cyanocobalamin (Vitamin B-12) 1,000 mcg tablet, Take 1 tablet (1,000 mcg) by mouth., Disp: , Rfl:     diclofenac sodium (Voltaren) 1 % gel gel, APPLY SPARINGLY TO THE AFFECTED AREA 2 TO 3 TIMES A DAY AS NEEDED FOR PAIN., Disp: , Rfl:     diphenoxylate-atropine (Lomotil) 2.5-0.025 mg tablet, Take 1 tablet by mouth 4 times a day as needed., Disp: , Rfl:     docusate sodium (Colace) 100 mg capsule, Take 1 capsule (100 mg) by mouth once daily as needed., Disp: , Rfl:     escitalopram (Lexapro) 10 mg tablet, Take 1 tablet by mouth  once daily., Disp: 90 tablet, Rfl: 3    gabapentin (Neurontin) 300 mg capsule, TAKE ONE CAPSULE BY MOUTH THREE TIMES A DAY. MAY TAKE 1 ADDTIONAL CAPSULE BY MOUTH AS NEEDED., Disp: 270 capsule, Rfl: 1    indapamide (Lozol) 2.5 mg tablet, Take 1 tablet by mouth once daily in the morning., Disp: 90 tablet, Rfl: 3    levothyroxine (Synthroid, Levoxyl) 50 mcg tablet, Take 1 tablet by mouth once daily in the morning on an empty stomach., Disp: 90 tablet, Rfl: 3    metoprolol tartrate (Lopressor) 25 mg tablet, Take 0.5 tablets (12.5 mg) by mouth 2 times a day., Disp: , Rfl:     multivitamin tablet, Take 1 tablet by mouth once daily., Disp: , Rfl:     omega-3 (Fish Oil) 60- mg capsule, TAKE ONE CAPSULE BY MOUTH TWO TIMES A DAY, Disp: 180 capsule, Rfl: 0    pantoprazole (ProtoNix) 40 mg EC tablet, Take 1 tablet (40 mg) by mouth once daily in the morning. Take before meals. Do not crush, chew, or split., Disp: , Rfl:     saccharomyces boulardii (Florastor) 250 mg capsule, Take 1 capsule (250 mg) by mouth once daily., Disp: , Rfl:     simvastatin (Zocor) 40 mg tablet, Take 1 tablet by mouth once daily in the evening., Disp: 90 tablet, Rfl: 3    triamcinolone (Kenalog) 0.1 % ointment, Apply 1 Application topically 2 times a day., Disp: , Rfl:     Trulicity 0.75 mg/0.5 mL pen injector, Inject 1 pen injector under the skin once every week., Disp: 6 mL, Rfl: 0     Review of Systems   Constitutional: Negative.    HENT: Negative.     Eyes: Negative.    Respiratory: Negative.     Cardiovascular: Negative.    Gastrointestinal: Negative.    Endocrine: Negative.    Genitourinary: Negative.    Musculoskeletal:  Positive for arthralgias, myalgias and neck pain. Negative for gait problem, joint swelling and neck stiffness.   Skin: Negative.    Allergic/Immunologic: Negative.    Neurological: Negative.    Psychiatric/Behavioral: Negative.          Physical Exam  Vitals and nursing note reviewed.   HENT:      Head: Normocephalic.       "Nose: Nose normal.   Eyes:      Extraocular Movements: Extraocular movements intact.      Conjunctiva/sclera: Conjunctivae normal.      Pupils: Pupils are equal, round, and reactive to light.   Cardiovascular:      Rate and Rhythm: Normal rate and regular rhythm.   Pulmonary:      Effort: Pulmonary effort is normal.      Breath sounds: Normal breath sounds.   Genitourinary:     Comments: Deferred  Musculoskeletal:         General: Tenderness present. No swelling, deformity or signs of injury.      Cervical back: No rigidity or tenderness.      Right lower leg: No edema.      Left lower leg: No edema.      Comments: \"For full disclosure, patient was asked to pull up their garment to properly visualize the spine. This is done by the patient without me touching their garment.  The spine/joints were examined using gloves.\"    No neck rigidity.  Full range of motion but with discomfort with lateral flexion.  Positive for Spurling test bilaterally.  Positive for paraspinal tenderness at the cervical region bilaterally at C5-C6, C6-C7.  With nonspecific radicular symptoms.  Positive for diffuse tenderness on palpation at the right shoulder joints including AC and GH.  Limited range of motion of the right arm/shoulder due to pain.  There is pain with arm extension.  Negative leg raise.  RUE 3-4/5 due to pain, LUE 4-5/5, BUE 4-5/5.   Skin:     General: Skin is warm and dry.   Neurological:      General: No focal deficit present.      Mental Status: She is alert and oriented to person, place, and time.   Psychiatric:         Mood and Affect: Mood normal.         Behavior: Behavior normal.          Last Recorded Vitals  /50 (BP Location: Right arm, Patient Position: Sitting, BP Cuff Size: Adult)   Pulse 60   Temp 36.7 °C (98 °F) (Temporal)   Resp 14   Wt 102 kg (224 lb)   SpO2 95%  Body mass index is 40.97 kg/m².       Pain Management Panel  More data exists         Latest Ref Rng & Units 3/8/2024 10/27/2023   Pain " Management Panel   Amphetamine Screen, Urine Presumptive Negative Presumptive Negative  Presumptive Negative    Barbiturate Screen, Urine Presumptive Negative Presumptive Negative  Presumptive Negative    Benzodiazepines Screen, Urine Presumptive Negative Presumptive Negative  Presumptive Negative    Fentanyl Screen, Urine Presumptive Negative Presumptive Negative  Presumptive Negative    Methadone Screen, Urine Presumptive Negative Presumptive Negative  -          Relevant Results    Narrative & Impression  MRN: [id number]  Patient Name: ZACKERY CALVILLO     STUDY:  SHOULDER, CMPLT, MIN 2 VIEWS;  9/16/2022 2:40 pm     INDICATION:  Increasing right shoulder pain.  History of fall where she landed on  her right shoulder.  M25.519: Shoulder pain.     COMPARISON:  None.     ACCESSION NUMBER(S):  [id number]     ORDERING CLINICIAN:  THOMAS MCKEON     FINDINGS:  Right shoulder, four views     There is severe joint space narrowing with sclerosis and  osteophytosis in the glenohumeral joint. Moderate narrowing  osteophytosis in the AC joint. There is no fracture. There is no  dislocation. There is diffuse osteopenia however     IMPRESSION:  Advanced glenohumeral joint osteoarthritis  ---------------     Assessment/Plan   Problem List Items Addressed This Visit             ICD-10-CM    Cervical radiculopathy M54.12    Relevant Medications    gabapentin (Neurontin) 300 mg capsule    Chronic right shoulder pain - Primary M25.511, G89.29    Relevant Medications    gabapentin (Neurontin) 300 mg capsule    Other Relevant Orders    FL pain management    Joint Injection/Aspiration    Arthritis, shoulder region M19.019    Relevant Medications    gabapentin (Neurontin) 300 mg capsule    Other Relevant Orders    FL pain management    Joint Injection/Aspiration    Myofascial pain M79.18    Relevant Medications    baclofen (Lioresal) 5 mg tablet     Other Visit Diagnoses         Codes    Facet arthropathy, cervical     M47.812    Relevant  Medications    gabapentin (Neurontin) 300 mg capsule    Medication management     Z79.899    Relevant Orders    QUEST MISCELLANEOUS TEST (ROOM TEMPERATURE)              Plan/Follow-up Instructions:   Continue with your prescribed medications.    Continue taking gabapentin 300 mg 3 times a day.  Do not take sedating medications together.    I refilled your baclofen and you may take this up to 2 times a day as needed for muscle pain relief.  Do not take sedating medications together.    ---------------    Your next appointment is with Dr. Cullen in:    [facility]    For pain block/injection on: 4/1/2025, right shoulder intra-articular joint injection    No aspirin on this day    LOCAL    °You will receive a phone call the weekday before your appointment/procedure.   °Please KEEP your scheduled appointments.     °BRING your photo ID, insurance information, and a correct list of your home medications to EVERY visit.    °Please call our office at [phone] if you have any questions.     You will then be seen for a postprocedure follow-up in 10 weeks in [facility] later appointment  ---------------      Time     Prep time on date of the patient encounter: 2  Time spent directly with patient/family/caregiver: 30   Documentation time: 2  Total time on date of patient encounter: 34      Marlen Waite DNP, APRN, FNP-C    [facility]  Office #: [phone]  Fax # [phone]    ---------------------  Disclaimer: This note was created using voice recognition software. It was not corrected for typographical or grammatical errors, inadvertent word insertion, or any unintended errors. Please feel free to contact me for clarification.  -----------------

## 2025-03-14 NOTE — PROGRESS NOTES
History Of Present Illness  Sara Zavala is a pleasant 79 y.o. female who is here for follow-up visit.  Patient presents in a wheelchair.  She ambulates at home with the use of cane.  She arrives with her brother who was also being seen.    Patient continues to have chronic right shoulder pain.  She also has neck pain but the shoulder bothers her more.  She rates her pain as 5-6 out of 10.  Pain can be constant or comes and goes depending on her mobilities.  Pain increases with motion.  She describes her pain as burning, pressure, spastic, stabbing and throbbing kind of pain.  Patient reports that her right shoulder pain is worsened when she lays on this side.  She reports it wakes her up from a sound sleep.  She continues to have stinging, pins and needle sensation to her arm.  She had cervical DEE in the past with no relief.  She denies increasing arm weakness, weakness in  or dropping objects.  She denies increasing leg weakness or change in balance.  She denies bowel or bladder incontinence.  She denies recent falls, injuries, or ER visits.  There has been no changes since she was last seen.    Patient continues to take gabapentin 300 mg 3 times a day.  She takes baclofen as needed for muscle pain relief.  She takes Tylenol on occasion.  She denies side effects to her medications.    I reviewed previous notes, labs and imaging.  I discussed the plan of care including pharmacologic and joint interventional procedure. Patient had right shoulder injection done on 6/14/2024 that worked well.  She would like this repeated.  This is a therapeutic procedure that is done under fluoroscopy.  Questions were started during this encounter.      The patient was counseled regarding diagnostic results, instructions for management, risk factor reductions, risks and benefits of treatment options and importance of compliance with treatment.    ---------------------  PROCEDURES:    6/14/2024: Right shoulder  injection  3/26/2024: Right shoulder injection  8/29/2023: Right shoulder injection  6/27/2023: Right shoulder injection  12/6/2022: Cervical DEE #1.  No relief  -------------    OARRS:  LUDIVINA Bray-CNP, DNP on 3/14/2025 11:23 AM  I have personally reviewed the OARRS report for Sara Zavala. I have considered the risks of abuse, dependence, addiction and diversion    Past Medical History  She has a past medical history of Abdominal hernia, Anemia, iron deficiency, Anxiety disorder, Atherosclerotic heart disease of native coronary artery without angina pectoris, Chronic back pain, Diabetes (Multi), Diabetic neuropathy (Multi), Diabetic retinopathy (Multi), Enlarged uterus, Hyperlipidemia, Hypertension, Hypothyroidism, Left ventricular ejection fraction greater than 40% (07/22/2019), Low back pain, Morbid obesity (Multi), Other specified diabetes mellitus without complications, Peptic ulcer disease, Personal history of other diseases of the circulatory system, Psoriasis, Vitamin B12 deficiency, and Vitamin D deficiency.    Surgical History  Past Surgical History:   Procedure Laterality Date    APPENDECTOMY  2005    CARDIAC CATHETERIZATION  05/03/2017    COLONOSCOPY  08/04/2010    COLONOSCOPY  07/31/2015    EPIDURAL BLOCK INJECTION  09/21/2010    EPIDURAL BLOCK INJECTION  10/2013    Dr. Cote    OTHER SURGICAL HISTORY      Carpal tunnel surgery    OTHER SURGICAL HISTORY      Phacoemulsification of cataract and insertion of intraocular lens    OTHER SURGICAL HISTORY  2004    Bariatric surgery - gastric bypass    OTHER SURGICAL HISTORY  1961    Incision and drainage of pilonidal cyst, also 1962    OTHER SURGICAL HISTORY  10/26/2019    Pacemaker insertion - carmen Florez    OTHER SURGICAL HISTORY  2005    Cholecystectomy laparoscopic    PILONIDAL CYST DRAINAGE  1961    PILONIDAL CYST DRAINAGE  1962    SINUS SURGERY  10/2013    sack of bacteria from left sinus (removed)    TOE SURGERY  1985    4  toe nails removed    UMBILICAL HERNIA REPAIR  1986        Social History  She reports that she has never smoked. She has never been exposed to tobacco smoke. She has never used smokeless tobacco. She reports that she does not currently use alcohol. She reports that she does not use drugs.    Family History  Family History   Problem Relation Name Age of Onset    Diabetes Mother      Heart disease Mother      Hypertension Mother      Heart disease Father      Stroke Father      Hypertension Sister      Mental illness Brother      Diabetes Brother      Hypertension Brother          Allergies  Ace inhibitors, Sulfamethoxazole-trimethoprim, and Tizanidine    Medications    Current Outpatient Medications:     aspirin 81 mg EC tablet, Take 1 tablet (81 mg) by mouth once daily., Disp: , Rfl:     baclofen (Lioresal) 5 mg tablet, Take 1 tablet (5 mg) by mouth as needed at bedtime for muscle spasms., Disp: 30 tablet, Rfl: 0    blood sugar diagnostic (True Metrix Glucose Test Strip) strip, test twice daily as directed, Disp: 100 each, Rfl: 3    calcium citrate/vitamin D3 (CALCIUM CITRATE + D ORAL), as directed Orally, Disp: , Rfl:     clobetasol (Temovate) 0.05 % ointment, Apply 1 Application topically 2 times a day., Disp: , Rfl:     clotrimazole (Lotrimin) 1 % cream, APPLY 1 APPLICATION EXTERNALLY TWICE A DAY, Disp: 60 g, Rfl: 0    cranberry extract 425 mg capsule, as directed Orally, Disp: , Rfl:     cyanocobalamin (Vitamin B-12) 1,000 mcg tablet, Take 1 tablet (1,000 mcg) by mouth., Disp: , Rfl:     diclofenac sodium (Voltaren) 1 % gel gel, APPLY SPARINGLY TO THE AFFECTED AREA 2 TO 3 TIMES A DAY AS NEEDED FOR PAIN., Disp: , Rfl:     diphenoxylate-atropine (Lomotil) 2.5-0.025 mg tablet, Take 1 tablet by mouth 4 times a day as needed., Disp: , Rfl:     docusate sodium (Colace) 100 mg capsule, Take 1 capsule (100 mg) by mouth once daily as needed., Disp: , Rfl:     escitalopram (Lexapro) 10 mg tablet, Take 1 tablet by mouth  once daily., Disp: 90 tablet, Rfl: 3    gabapentin (Neurontin) 300 mg capsule, TAKE ONE CAPSULE BY MOUTH THREE TIMES A DAY. MAY TAKE 1 ADDTIONAL CAPSULE BY MOUTH AS NEEDED., Disp: 270 capsule, Rfl: 1    indapamide (Lozol) 2.5 mg tablet, Take 1 tablet by mouth once daily in the morning., Disp: 90 tablet, Rfl: 3    levothyroxine (Synthroid, Levoxyl) 50 mcg tablet, Take 1 tablet by mouth once daily in the morning on an empty stomach., Disp: 90 tablet, Rfl: 3    metoprolol tartrate (Lopressor) 25 mg tablet, Take 0.5 tablets (12.5 mg) by mouth 2 times a day., Disp: , Rfl:     multivitamin tablet, Take 1 tablet by mouth once daily., Disp: , Rfl:     omega-3 (Fish Oil) 60- mg capsule, TAKE ONE CAPSULE BY MOUTH TWO TIMES A DAY, Disp: 180 capsule, Rfl: 0    pantoprazole (ProtoNix) 40 mg EC tablet, Take 1 tablet (40 mg) by mouth once daily in the morning. Take before meals. Do not crush, chew, or split., Disp: , Rfl:     saccharomyces boulardii (Florastor) 250 mg capsule, Take 1 capsule (250 mg) by mouth once daily., Disp: , Rfl:     simvastatin (Zocor) 40 mg tablet, Take 1 tablet by mouth once daily in the evening., Disp: 90 tablet, Rfl: 3    triamcinolone (Kenalog) 0.1 % ointment, Apply 1 Application topically 2 times a day., Disp: , Rfl:     Trulicity 0.75 mg/0.5 mL pen injector, Inject 1 pen injector under the skin once every week., Disp: 6 mL, Rfl: 0     Review of Systems   Constitutional: Negative.    HENT: Negative.     Eyes: Negative.    Respiratory: Negative.     Cardiovascular: Negative.    Gastrointestinal: Negative.    Endocrine: Negative.    Genitourinary: Negative.    Musculoskeletal:  Positive for arthralgias, myalgias and neck pain. Negative for gait problem, joint swelling and neck stiffness.   Skin: Negative.    Allergic/Immunologic: Negative.    Neurological: Negative.    Psychiatric/Behavioral: Negative.          Physical Exam  Vitals and nursing note reviewed.   HENT:      Head: Normocephalic.       "Nose: Nose normal.   Eyes:      Extraocular Movements: Extraocular movements intact.      Conjunctiva/sclera: Conjunctivae normal.      Pupils: Pupils are equal, round, and reactive to light.   Cardiovascular:      Rate and Rhythm: Normal rate and regular rhythm.   Pulmonary:      Effort: Pulmonary effort is normal.      Breath sounds: Normal breath sounds.   Genitourinary:     Comments: Deferred  Musculoskeletal:         General: Tenderness present. No swelling, deformity or signs of injury.      Cervical back: No rigidity or tenderness.      Right lower leg: No edema.      Left lower leg: No edema.      Comments: \"For full disclosure, patient was asked to pull up their garment to properly visualize the spine. This is done by the patient without me touching their garment.  The spine/joints were examined using gloves.\"    No neck rigidity.  Full range of motion but with discomfort with lateral flexion.  Positive for Spurling test bilaterally.  Positive for paraspinal tenderness at the cervical region bilaterally at C5-C6, C6-C7.  With nonspecific radicular symptoms.  Positive for diffuse tenderness on palpation at the right shoulder joints including AC and GH.  Limited range of motion of the right arm/shoulder due to pain.  There is pain with arm extension.  Negative leg raise.  RUE 3-4/5 due to pain, LUE 4-5/5, BUE 4-5/5.   Skin:     General: Skin is warm and dry.   Neurological:      General: No focal deficit present.      Mental Status: She is alert and oriented to person, place, and time.   Psychiatric:         Mood and Affect: Mood normal.         Behavior: Behavior normal.          Last Recorded Vitals  /50 (BP Location: Right arm, Patient Position: Sitting, BP Cuff Size: Adult)   Pulse 60   Temp 36.7 °C (98 °F) (Temporal)   Resp 14   Wt 102 kg (224 lb)   SpO2 95%  Body mass index is 40.97 kg/m².       Pain Management Panel  More data exists         Latest Ref Rng & Units 3/8/2024 10/27/2023   Pain " Management Panel   Amphetamine Screen, Urine Presumptive Negative Presumptive Negative  Presumptive Negative    Barbiturate Screen, Urine Presumptive Negative Presumptive Negative  Presumptive Negative    Benzodiazepines Screen, Urine Presumptive Negative Presumptive Negative  Presumptive Negative    Fentanyl Screen, Urine Presumptive Negative Presumptive Negative  Presumptive Negative    Methadone Screen, Urine Presumptive Negative Presumptive Negative  -          Relevant Results    Narrative & Impression  MRN: [id number]  Patient Name: ZACKERY CALVILLO     STUDY:  SHOULDER, CMPLT, MIN 2 VIEWS;  9/16/2022 2:40 pm     INDICATION:  Increasing right shoulder pain.  History of fall where she landed on  her right shoulder.  M25.519: Shoulder pain.     COMPARISON:  None.     ACCESSION NUMBER(S):  [id number]     ORDERING CLINICIAN:  THOMAS MCKEON     FINDINGS:  Right shoulder, four views     There is severe joint space narrowing with sclerosis and  osteophytosis in the glenohumeral joint. Moderate narrowing  osteophytosis in the AC joint. There is no fracture. There is no  dislocation. There is diffuse osteopenia however     IMPRESSION:  Advanced glenohumeral joint osteoarthritis  ---------------     Assessment/Plan   Problem List Items Addressed This Visit             ICD-10-CM    Cervical radiculopathy M54.12    Relevant Medications    gabapentin (Neurontin) 300 mg capsule    Chronic right shoulder pain - Primary M25.511, G89.29    Relevant Medications    gabapentin (Neurontin) 300 mg capsule    Other Relevant Orders    FL pain management    Joint Injection/Aspiration    Arthritis, shoulder region M19.019    Relevant Medications    gabapentin (Neurontin) 300 mg capsule    Other Relevant Orders    FL pain management    Joint Injection/Aspiration    Myofascial pain M79.18    Relevant Medications    baclofen (Lioresal) 5 mg tablet     Other Visit Diagnoses         Codes    Facet arthropathy, cervical     M47.812    Relevant  Medications    gabapentin (Neurontin) 300 mg capsule    Medication management     Z79.899    Relevant Orders    QUEST MISCELLANEOUS TEST (ROOM TEMPERATURE)              Plan/Follow-up Instructions:   Continue with your prescribed medications.    Continue taking gabapentin 300 mg 3 times a day.  Do not take sedating medications together.    I refilled your baclofen and you may take this up to 2 times a day as needed for muscle pain relief.  Do not take sedating medications together.    ---------------    Your next appointment is with Dr. Cullen in:    [facility]    For pain block/injection on: 4/1/2025, right shoulder intra-articular joint injection    No aspirin on this day    LOCAL    °You will receive a phone call the weekday before your appointment/procedure.   °Please KEEP your scheduled appointments.     °BRING your photo ID, insurance information, and a correct list of your home medications to EVERY visit.    °Please call our office at [phone] if you have any questions.     You will then be seen for a postprocedure follow-up in 10 weeks in [facility] later appointment  ---------------      Time     Prep time on date of the patient encounter: 2  Time spent directly with patient/family/caregiver: 30   Documentation time: 2  Total time on date of patient encounter: 34      Marlen Waite DNP, APRN, FNP-C    [facility]  Office #: [phone]  Fax # [phone]    ---------------------  Disclaimer: This note was created using voice recognition software. It was not corrected for typographical or grammatical errors, inadvertent word insertion, or any unintended errors. Please feel free to contact me for clarification.  -----------------

## 2025-03-14 NOTE — PATIENT INSTRUCTIONS
Continue with your prescribed medications.    Continue taking gabapentin 300 mg 3 times a day.  Do not take sedating medications together.    I refilled your baclofen and you may take this up to 2 times a day as needed for muscle pain relief.  Do not take sedating medications together.    ---------------    Your next appointment is with Dr. Cullen in:    [facility]    For pain block/injection on: 4/1/2025, right shoulder intra-articular joint injection    No aspirin on this day    LOCAL    °You will receive a phone call the weekday before your appointment/procedure.   °Please KEEP your scheduled appointments.     °BRING your photo ID, insurance information, and a correct list of your home medications to EVERY visit.    °Please call our office at [phone] if you have any questions.     You will then be seen for a postprocedure follow-up in 10 weeks in [place], prefers later appointment  ---------------

## 2025-03-14 NOTE — PROGRESS NOTES
Last urine drug screening date/ordered today: 9/13/2024     Results of last screen: As expected.  Updated today      Last opioid risk screening date/ordered today: 9/13/2024      Pain Scale Screening:   Pain Assessment and Documentation Tool (PADT)   Date of Assessment: 3/14/2025  Analgesia:   Patient reports her pain level on average during the past week is 5on a 0 - 10 scale.   Patient reports that her pain level at its worst during the past week was 8 on a 0 -10 scale.   70% of pain has been relieved during the past week per patient   Patient states that the amount of pain relief she is now obtaining from her current pain reliever(s) is enough to make a real difference in her life.   Query to clinician: Is the patient's pain relief clinically significant? yes  Activities of Daily Living:   Physical functioning: better  Family relationships: unchanged  Social relationships: unchanged  Mood: worse  Sleep patterns: unchanged  Overall functioning: unchanged  Adverse Events: No, Sara Zavala is not experiencing side effects from current pain reliever.  Patients overall severity of side effect:none  Specific Analgesic Plan: Continue present regimen.

## 2025-03-21 LAB
6MAM SAL QL SCN: NEGATIVE NG/ML
BUPRENORPHINE SAL QL SCN: NEGATIVE NG/ML
FENTANYL SAL QL SCN: NEGATIVE NG/ML
GABAPENTIN SAL CFM-MCNC: 149 NG/ML
Lab: POSITIVE NG/ML
OPIATES SAL QL SCN: NEGATIVE NG/ML
TAPENTADOL SAL QL SCN: NEGATIVE NG/ML
TRAMADOL SAL QL SCN: NEGATIVE NG/ML

## 2025-03-26 DIAGNOSIS — I10 ESSENTIAL HYPERTENSION: ICD-10-CM

## 2025-03-26 RX ORDER — INDAPAMIDE 2.5 MG/1
2.5 TABLET ORAL
Qty: 90 TABLET | Refills: 3 | Status: SHIPPED | OUTPATIENT
Start: 2025-03-26

## 2025-03-31 ENCOUNTER — TELEPHONE (OUTPATIENT)
Dept: PRIMARY CARE | Facility: CLINIC | Age: 80
End: 2025-03-31
Payer: MEDICARE

## 2025-03-31 DIAGNOSIS — R05.1 ACUTE COUGH: ICD-10-CM

## 2025-03-31 NOTE — TELEPHONE ENCOUNTER
Patient states she is scheduled to go out tomorrow, will complete at hospital.  Xray order entered with dx. Acute cough ok per SUSANNE Breen NP in office.

## 2025-03-31 NOTE — TELEPHONE ENCOUNTER
Patient left message on office voicemail requesting a chest xray, reports she has had worsending cough over the last 10 days.  States cough worse when lying down and in the morning and evenings. Endorses pain between her shoulder blades and when she takes a deep breath some discomfort in her chest, has increased nasal drainage down the back of her throat, is coughing up some phlegm/sputum with a yellow to bright green color.  Patient  states she will have a ride to [facility] tomorrow and could get the xray completed then.  Please advise.

## 2025-04-01 ENCOUNTER — HOSPITAL ENCOUNTER (OUTPATIENT)
Dept: PAIN MEDICINE | Facility: HOSPITAL | Age: 80
Discharge: HOME | End: 2025-04-01
Payer: MEDICARE

## 2025-04-01 ENCOUNTER — HOSPITAL ENCOUNTER (OUTPATIENT)
Dept: RADIOLOGY | Facility: HOSPITAL | Age: 80
Discharge: HOME | End: 2025-04-01
Payer: MEDICARE

## 2025-04-01 VITALS
SYSTOLIC BLOOD PRESSURE: 119 MMHG | BODY MASS INDEX: 42.19 KG/M2 | DIASTOLIC BLOOD PRESSURE: 79 MMHG | HEART RATE: 66 BPM | RESPIRATION RATE: 18 BRPM | WEIGHT: 229.28 LBS | HEIGHT: 62 IN | TEMPERATURE: 97.3 F | OXYGEN SATURATION: 97 %

## 2025-04-01 DIAGNOSIS — R05.1 ACUTE COUGH: ICD-10-CM

## 2025-04-01 DIAGNOSIS — M19.019 ARTHRITIS, SHOULDER REGION: ICD-10-CM

## 2025-04-01 DIAGNOSIS — M25.511 CHRONIC RIGHT SHOULDER PAIN: ICD-10-CM

## 2025-04-01 DIAGNOSIS — G89.29 CHRONIC RIGHT SHOULDER PAIN: ICD-10-CM

## 2025-04-01 PROCEDURE — 20610 DRAIN/INJ JOINT/BURSA W/O US: CPT | Performed by: ANESTHESIOLOGY

## 2025-04-01 PROCEDURE — 71046 X-RAY EXAM CHEST 2 VIEWS: CPT | Performed by: RADIOLOGY

## 2025-04-01 PROCEDURE — 2500000004 HC RX 250 GENERAL PHARMACY W/ HCPCS (ALT 636 FOR OP/ED): Performed by: ANESTHESIOLOGY

## 2025-04-01 PROCEDURE — 71046 X-RAY EXAM CHEST 2 VIEWS: CPT

## 2025-04-01 PROCEDURE — 2550000001 HC RX 255 CONTRASTS: Performed by: ANESTHESIOLOGY

## 2025-04-01 RX ORDER — BUPIVACAINE HYDROCHLORIDE 2.5 MG/ML
INJECTION, SOLUTION EPIDURAL; INFILTRATION; INTRACAUDAL; PERINEURAL AS NEEDED
Status: COMPLETED | OUTPATIENT
Start: 2025-04-01 | End: 2025-04-01

## 2025-04-01 RX ORDER — TRIAMCINOLONE ACETONIDE 40 MG/ML
INJECTION, SUSPENSION INTRA-ARTICULAR; INTRAMUSCULAR AS NEEDED
Status: COMPLETED | OUTPATIENT
Start: 2025-04-01 | End: 2025-04-01

## 2025-04-01 RX ORDER — LIDOCAINE HYDROCHLORIDE 10 MG/ML
INJECTION, SOLUTION EPIDURAL; INFILTRATION; INTRACAUDAL; PERINEURAL AS NEEDED
Status: COMPLETED | OUTPATIENT
Start: 2025-04-01 | End: 2025-04-01

## 2025-04-01 RX ADMIN — LIDOCAINE HYDROCHLORIDE 4 ML: 10 INJECTION, SOLUTION EPIDURAL; INFILTRATION; INTRACAUDAL; PERINEURAL at 12:54

## 2025-04-01 RX ADMIN — IOHEXOL 4 ML: 240 INJECTION, SOLUTION INTRATHECAL; INTRAVASCULAR; INTRAVENOUS; ORAL at 12:55

## 2025-04-01 RX ADMIN — TRIAMCINOLONE ACETONIDE 40 MG: 40 INJECTION, SUSPENSION INTRA-ARTICULAR; INTRAMUSCULAR at 12:55

## 2025-04-01 RX ADMIN — BUPIVACAINE HYDROCHLORIDE 5 ML: 2.5 INJECTION, SOLUTION EPIDURAL; INFILTRATION; INTRACAUDAL; PERINEURAL at 12:55

## 2025-04-01 ASSESSMENT — COLUMBIA-SUICIDE SEVERITY RATING SCALE - C-SSRS
1. IN THE PAST MONTH, HAVE YOU WISHED YOU WERE DEAD OR WISHED YOU COULD GO TO SLEEP AND NOT WAKE UP?: NO
2. HAVE YOU ACTUALLY HAD ANY THOUGHTS OF KILLING YOURSELF?: NO
6. HAVE YOU EVER DONE ANYTHING, STARTED TO DO ANYTHING, OR PREPARED TO DO ANYTHING TO END YOUR LIFE?: NO

## 2025-04-01 ASSESSMENT — PATIENT HEALTH QUESTIONNAIRE - PHQ9
1. LITTLE INTEREST OR PLEASURE IN DOING THINGS: NOT AT ALL
2. FEELING DOWN, DEPRESSED OR HOPELESS: NOT AT ALL
SUM OF ALL RESPONSES TO PHQ9 QUESTIONS 1 AND 2: 0

## 2025-04-01 ASSESSMENT — PAIN - FUNCTIONAL ASSESSMENT
PAIN_FUNCTIONAL_ASSESSMENT: 0-10

## 2025-04-01 ASSESSMENT — PAIN DESCRIPTION - DESCRIPTORS: DESCRIPTORS: ACHING

## 2025-04-01 ASSESSMENT — PAIN SCALES - GENERAL
PAINLEVEL_OUTOF10: 0 - NO PAIN
PAINLEVEL_OUTOF10: 2
PAINLEVEL_OUTOF10: 2

## 2025-04-01 NOTE — DISCHARGE INSTRUCTIONS
No heavy lifting or strenuous activity today.   Keep bandage on for 24 hours.  Keep injection site clean and dry, it may be sore for up to 48 hours.  For relief of pain and swelling, you may apply ice to the injection site area.  If pain persist you may apply moist heat.  Common side effects of steroids include insomnia, facial flushing, increased appetite, headaches, sweating, fluid retention. If you have diabetes your blood sugar may increase. Please monitor your diet and your blood sugar should return to normal in a few days. If your sugar becomes dangerously high, please contact your physician that manages your diabetes for further guidance.  Rare side effects include infection, bleeding, nerve damage. If you have fever, redness or swelling near site, severe pain, please go to the nearest emergency room. For other questions please call office at [phone]. Local anesthetics wear off in several hours and duration of relief vary from person to person.

## 2025-04-11 DIAGNOSIS — E78.1 HIGH TRIGLYCERIDES: ICD-10-CM

## 2025-04-11 DIAGNOSIS — E11.319 DIABETIC RETINOPATHY ASSOCIATED WITH CONTROLLED TYPE 2 DIABETES MELLITUS: ICD-10-CM

## 2025-04-13 RX ORDER — ASPIRIN 325 MG
1 TABLET, DELAYED RELEASE (ENTERIC COATED) ORAL 2 TIMES DAILY
Qty: 180 CAPSULE | Refills: 3 | Status: SHIPPED | OUTPATIENT
Start: 2025-04-13

## 2025-04-13 RX ORDER — DULAGLUTIDE 0.75 MG/.5ML
INJECTION, SOLUTION SUBCUTANEOUS
Qty: 6 ML | Refills: 3 | Status: SHIPPED | OUTPATIENT
Start: 2025-04-13

## 2025-04-14 ENCOUNTER — TELEPHONE (OUTPATIENT)
Dept: PRIMARY CARE | Facility: CLINIC | Age: 80
End: 2025-04-14
Payer: MEDICARE

## 2025-04-14 NOTE — TELEPHONE ENCOUNTER
Call placed to patient number as listed, Address and insurance confirmed.  COVID Screening negative, verbal consent obtained.  Patient aware of one hour before or after scheduled appointment time via conversation with this nurse.  Appointment confirmed.

## 2025-04-15 ENCOUNTER — OFFICE VISIT (OUTPATIENT)
Dept: PRIMARY CARE | Facility: CLINIC | Age: 80
End: 2025-04-15
Payer: MEDICARE

## 2025-04-15 VITALS
HEIGHT: 62 IN | RESPIRATION RATE: 16 BRPM | BODY MASS INDEX: 42.14 KG/M2 | HEART RATE: 60 BPM | WEIGHT: 229 LBS | SYSTOLIC BLOOD PRESSURE: 118 MMHG | TEMPERATURE: 96.9 F | OXYGEN SATURATION: 94 % | DIASTOLIC BLOOD PRESSURE: 68 MMHG

## 2025-04-15 DIAGNOSIS — I10 ESSENTIAL HYPERTENSION: ICD-10-CM

## 2025-04-15 DIAGNOSIS — E11.42 TYPE 2 DIABETES MELLITUS WITH DIABETIC POLYNEUROPATHY, WITHOUT LONG-TERM CURRENT USE OF INSULIN: Primary | ICD-10-CM

## 2025-04-15 DIAGNOSIS — E78.1 HIGH TRIGLYCERIDES: ICD-10-CM

## 2025-04-15 DIAGNOSIS — R26.2 DIFFICULTY WALKING: ICD-10-CM

## 2025-04-15 DIAGNOSIS — E55.9 VITAMIN D DEFICIENCY: ICD-10-CM

## 2025-04-15 DIAGNOSIS — G62.9 POLYNEUROPATHY: ICD-10-CM

## 2025-04-15 DIAGNOSIS — E03.9 ACQUIRED HYPOTHYROIDISM: ICD-10-CM

## 2025-04-15 DIAGNOSIS — K21.9 GASTROESOPHAGEAL REFLUX DISEASE WITHOUT ESOPHAGITIS: ICD-10-CM

## 2025-04-15 PROCEDURE — 3078F DIAST BP <80 MM HG: CPT | Performed by: NURSE PRACTITIONER

## 2025-04-15 PROCEDURE — 1036F TOBACCO NON-USER: CPT | Performed by: NURSE PRACTITIONER

## 2025-04-15 PROCEDURE — 1126F AMNT PAIN NOTED NONE PRSNT: CPT | Performed by: NURSE PRACTITIONER

## 2025-04-15 PROCEDURE — 36415 COLL VENOUS BLD VENIPUNCTURE: CPT | Performed by: NURSE PRACTITIONER

## 2025-04-15 PROCEDURE — 1159F MED LIST DOCD IN RCRD: CPT | Performed by: NURSE PRACTITIONER

## 2025-04-15 PROCEDURE — 1160F RVW MEDS BY RX/DR IN RCRD: CPT | Performed by: NURSE PRACTITIONER

## 2025-04-15 PROCEDURE — 3074F SYST BP LT 130 MM HG: CPT | Performed by: NURSE PRACTITIONER

## 2025-04-15 PROCEDURE — 99349 HOME/RES VST EST MOD MDM 40: CPT | Performed by: NURSE PRACTITIONER

## 2025-04-15 ASSESSMENT — PAIN SCALES - GENERAL: PAINLEVEL_OUTOF10: 0-NO PAIN

## 2025-04-15 NOTE — PROGRESS NOTES
Subjective   Patient ID: Sara Zavala is a 79 y.o. female who presents for Follow-up (Routine follow up, chronic medical conditions, labs).    Visit for 78 y/o female seen today in private home, alone for routine follow up of chronic medical conditions. 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 Pt is sitting on new lift chair this morning, legs dependent. She is alert, oriented, able to answer all questions regarding her health. Pt lives in a single family home with her brother. She does not drive anymore and has limited mobility making it difficult to get out for medical appointments. Patients brother Larry assists with transportation to any medical appointments. Pt reports increased difficulty during the winter months, mostly due to unsteady gait, fear of falling. She also reports that she is unable to stand for long periods or sit in waiting rooms for long periods due to chronic back pain. Pt has an appointment with her podiatrist Dr. Zee tomorrow for a toenail trim/diabetic exam and will be seeing cardiologist Dr. Young on 4/21/25. Pt manages her own medications. She denies any issues with compliance. She is able to do her own personal hygiene, dressing/toileting but requires assistance with meal preparation, housekeeping, grocery shopping and home maintenance. Pt is ambulatory with a walker. She will also furniture walk in the home. Denies recent fall or injury.      Chronic pain - pt endorses pain in low back, bilateral knees and right shoulder. Follows with pain management services. Pt received an injection to right shoulder on 4/1. She reports the injection to be helpful, most relief within the first month and then the pain gradually comes back. Pt reports that the gabapentin, Voltaren gel, and baclofen is effective for pain management.      HTN/AVHB - pt continues to follow with  cardiologist Dr. Young. She has follow up scheduled next Monday. s/p Metronic Jaqueline DDD pacer, 60 -130 (10/25/2019). TTE in 10/2025 with EF 60%. Pt denies headaches, chest pain, palpitations, increased shortness of breath, edema or signs of weight gain. Patient has a home BP Cuff but reports that something is wrong with it, the cuff keeps giving an error message instead of BP values.      DM- patient continues on Trulicity weekly. She monitors her glucose levels daily. Reports FBS of 135 today which is high for her. Pt does admit that she had a handful of jelly beans last night. Glucometer reviewed. BG values range from 115-250. Pt denies any recent hypoglycemic episodes. Podiatrist is Dr. Zee. She is scheduled to be seen tomorrow. Pt follows with her eye specialist Dr. Watts annually and Dr. Topete.     Home Visit:          Medically necessary due to: Illness or condition that results in activity lmitation or restriction that impacts the ability to leave home such as:, unsteady gait/poor balance         Current Outpatient Medications:     aspirin 81 mg EC tablet, Take 1 tablet (81 mg) by mouth once daily., Disp: , Rfl:     baclofen (Lioresal) 5 mg tablet, Take 1 tablet (5 mg) by mouth 2 times a day as needed for muscle spasms., Disp: 180 tablet, Rfl: 0    blood sugar diagnostic (True Metrix Glucose Test Strip) strip, test twice daily as directed, Disp: 100 each, Rfl: 3    calcium citrate/vitamin D3 (CALCIUM CITRATE + D ORAL), as directed Orally, Disp: , Rfl:     clobetasol (Temovate) 0.05 % ointment, Apply 1 Application topically 2 times a day., Disp: , Rfl:     clotrimazole (Lotrimin) 1 % cream, APPLY 1 APPLICATION EXTERNALLY TWICE A DAY, Disp: 60 g, Rfl: 0    cranberry extract 425 mg capsule, as directed Orally, Disp: , Rfl:     cyanocobalamin (Vitamin B-12) 1,000 mcg tablet, Take 1 tablet (1,000 mcg) by mouth., Disp: , Rfl:     diclofenac sodium (Voltaren) 1 % gel gel, APPLY SPARINGLY TO THE AFFECTED AREA 2  TO 3 TIMES A DAY AS NEEDED FOR PAIN., Disp: , Rfl:     diphenoxylate-atropine (Lomotil) 2.5-0.025 mg tablet, Take 1 tablet by mouth 4 times a day as needed., Disp: , Rfl:     docusate sodium (Colace) 100 mg capsule, Take 1 capsule (100 mg) by mouth once daily as needed., Disp: , Rfl:     dulaglutide (Trulicity) 0.75 mg/0.5 mL pen injector, INJECT 1 PEN INJECTOR UNDER THE SKIN ONCE EVERY WEEK, Disp: 6 mL, Rfl: 3    escitalopram (Lexapro) 10 mg tablet, Take 1 tablet by mouth once daily., Disp: 90 tablet, Rfl: 3    gabapentin (Neurontin) 300 mg capsule, Take 1 capsule (300 mg) by mouth 3 times a day., Disp: 270 capsule, Rfl: 0    indapamide (Lozol) 2.5 mg tablet, TAKE ONE TABLET BY MOUTH DAILY IN THE MORNING, Disp: 90 tablet, Rfl: 3    levothyroxine (Synthroid, Levoxyl) 50 mcg tablet, Take 1 tablet by mouth once daily in the morning on an empty stomach., Disp: 90 tablet, Rfl: 3    metoprolol tartrate (Lopressor) 25 mg tablet, Take 0.5 tablets (12.5 mg) by mouth 2 times a day., Disp: , Rfl:     multivitamin tablet, Take 1 tablet by mouth once daily., Disp: , Rfl:     omega-3 (Fish Oil) 60- mg capsule, TAKE ONE CAPSULE BY MOUTH TWO TIMES A DAY, Disp: 180 capsule, Rfl: 3    pantoprazole (ProtoNix) 40 mg EC tablet, Take 1 tablet (40 mg) by mouth once daily in the morning. Take before meals. Do not crush, chew, or split., Disp: , Rfl:     saccharomyces boulardii (Florastor) 250 mg capsule, Take 1 capsule (250 mg) by mouth once daily., Disp: , Rfl:     simvastatin (Zocor) 40 mg tablet, Take 1 tablet by mouth once daily in the evening., Disp: 90 tablet, Rfl: 3    triamcinolone (Kenalog) 0.1 % ointment, Apply 1 Application topically 2 times a day., Disp: , Rfl:      Review of Systems  Constitutional:  Negative for appetite change, chills, fatigue, fever and unexpected weight change.   HENT: Positive for occasional difficulty swallowing. Negative for congestion, hearing loss, nosebleeds, postnasal drip, rhinorrhea, sinus  "pressure, sinus pain, sneezing, sore throat.   Eyes:  Negative for visual disturbance.        Wears glasses.   Respiratory:  Positive for shortness of breath on exertion. Cough in the mornings. Negative for choking, chest tightness, wheezing.    Cardiovascular: Negative for chest pain, palpitations and leg swelling.   Gastrointestinal:  Positive for constipation, diarrhea and nausea. Negative for abdominal distention, abdominal pain, anal bleeding, blood in stool, rectal pain and vomiting.        History of bariatric surgery. Intermittent issues with both diarrhea and constipation, takes stool softener, probiotic, fiber, and daily MVI.   Endocrine: Negative for polydipsia, polyphagia and polyuria.   Genitourinary:  Negative for difficulty urinating, dysuria, flank pain, frequency, hematuria, pelvic pain and urgency.   Musculoskeletal:  Positive for arthralgias, back pain and gait problem.        Chronic back and bilateral knee pain, at baseline.   Skin:  Negative for rash and wound.   Neurological: Negative for dizziness, seizures, syncope, facial asymmetry, speech difficulty, light-headedness and headaches.   BUE and BLE with neuropathy.   Hematological:  Does not bruise/bleed easily.   Psychiatric/Behavioral:  Negative for agitation, confusion, decreased concentration, dysphoric mood and sleep disturbance. The patient is not nervous/anxious.      Objective   /68 (BP Location: Left arm, Patient Position: Sitting, BP Cuff Size: Adult)   Pulse 60   Temp 36.1 °C (96.9 °F) (Temporal)   Resp 16   Ht 1.575 m (5' 2\")   Wt 104 kg (229 lb)   SpO2 94%   BMI 41.88 kg/m²     Physical Exam  Constitutional:       General: She is not in acute distress.     Appearance: She is obese. She is not toxic-appearing.      Comments: Chronically ill appearing. Well-groomed, well-nourished. Alert.   HENT:      Nose: No congestion or rhinorrhea.      Mouth/Throat:      Mouth: Mucous membranes are moist.      Pharynx: Oropharynx " is clear. No oropharyngeal exudate or posterior oropharyngeal erythema.   Eyes:      General: No scleral icterus.     Extraocular Movements: Extraocular movements intact.      Conjunctiva/sclera: Conjunctivae normal.      Pupils: Pupils are equal, round, and reactive to light.   Cardiovascular:      Rate and Rhythm: Normal rate and regular rhythm.      Pulses: Normal pulses.      Heart sounds: Normal heart sounds. No murmur heard  Pulmonary:      Effort: Pulmonary effort is normal. No respiratory distress.      Breath sounds: No wheezing or rales.   Abdominal:      General: Bowel sounds are normal. There is no distension.      Palpations: Abdomen is soft.      Tenderness: There is no abdominal tenderness. There is no guarding or rebound.      Hernia: A hernia is present.      Comments: Abdominal hernias are reducible.    Musculoskeletal:      Cervical back: Neck supple.      Right lower leg: No edema.      Left lower leg: No edema.      Comments: Bilateral knees with decreased ROM.   Skin:     General: Skin is warm and dry.      Capillary Refill: Capillary refill takes less than 2 seconds.      Coloration: Skin is not jaundiced.      Findings: No bruising, lesion or rash.   Neurological:      General: No focal deficit present.      Mental Status: She is alert and oriented to person, place, and time.      Comments: Clear and coherent speech, MAEx4 with equal strength: BUE 5/5, BLE 4/5  Psychiatric:         Mood and Affect: Mood normal.         Behavior: Behavior normal.         Thought Content: Thought content normal.         Judgment: Judgment normal.     Assessment/Plan   Diagnoses and all orders for this visit:  Type 2 diabetes mellitus with diabetic polyneuropathy, without long-term current use of insulin  -     Hemoglobin A1c; Future  -chronic, managed   -continue Trulicity weekly   -will check A1c and renal function today     Polyneuropathy  -continue gabapentin, Voltaren gel, and baclofen  -continue safety  interventions and fall risk prevention strategies    Gastroesophageal reflux disease without esophagitis  -chronic, stable, no current GI concerns   -continue Pantoprazole     Essential hypertension  -     CBC and Auto Differential; Future  -     Comprehensive metabolic panel; Future  -chronic, vitals stable  -follow up with cardiology next week as scheduled   -continue Indapamide, Metoprolol     High triglycerides  -     Lipid panel; Future  -chronic, managed with Simvastatin, Omega-3    Acquired hypothyroidism  -     Tsh With Reflex To Free T4 If Abnormal; Future  -chronic, managed with Levothyroxine     Vitamin D deficiency  -chronic, managed with calcium/vitamin d supplement    Difficulty walking  -chronic, high fall risk   -continue to ambulate with walker to minimize fall risk  -continue safety measures and supportive care    Routine labs obtained in home- CBC, CMP, Lipid panel, TSH, A1c level- pt tolerated well     Pt stable. Will continue house calls NP program due to limited mobility, difficulty getting out for medical appointments. Advised pt to contact house calls office with any acute concerns or medication needs.          Joi Breen, APRN-CNP

## 2025-04-16 LAB
ALBUMIN SERPL-MCNC: 3.9 G/DL (ref 3.6–5.1)
ALP SERPL-CCNC: 59 U/L (ref 37–153)
ALT SERPL-CCNC: 16 U/L (ref 6–29)
ANION GAP SERPL CALCULATED.4IONS-SCNC: 8 MMOL/L (CALC) (ref 7–17)
AST SERPL-CCNC: 21 U/L (ref 10–35)
BASOPHILS # BLD AUTO: 62 CELLS/UL (ref 0–200)
BASOPHILS NFR BLD AUTO: 0.6 %
BILIRUB SERPL-MCNC: 0.6 MG/DL (ref 0.2–1.2)
BUN SERPL-MCNC: 28 MG/DL (ref 7–25)
CALCIUM SERPL-MCNC: 9 MG/DL (ref 8.6–10.4)
CHLORIDE SERPL-SCNC: 101 MMOL/L (ref 98–110)
CHOLEST SERPL-MCNC: 122 MG/DL
CHOLEST/HDLC SERPL: 3.2 (CALC)
CO2 SERPL-SCNC: 30 MMOL/L (ref 20–32)
CREAT SERPL-MCNC: 0.85 MG/DL (ref 0.6–0.95)
EGFRCR SERPLBLD CKD-EPI 2021: 69 ML/MIN/1.73M2
EOSINOPHIL # BLD AUTO: 206 CELLS/UL (ref 15–500)
EOSINOPHIL NFR BLD AUTO: 2 %
ERYTHROCYTE [DISTWIDTH] IN BLOOD BY AUTOMATED COUNT: 12.3 % (ref 11–15)
EST. AVERAGE GLUCOSE BLD GHB EST-MCNC: 126 MG/DL
EST. AVERAGE GLUCOSE BLD GHB EST-SCNC: 7 MMOL/L
GLUCOSE SERPL-MCNC: 107 MG/DL (ref 65–99)
HBA1C MFR BLD: 6 %
HCT VFR BLD AUTO: 40.3 % (ref 35–45)
HDLC SERPL-MCNC: 38 MG/DL
HGB BLD-MCNC: 13.3 G/DL (ref 11.7–15.5)
LDLC SERPL CALC-MCNC: 63 MG/DL (CALC)
LYMPHOCYTES # BLD AUTO: 3584 CELLS/UL (ref 850–3900)
LYMPHOCYTES NFR BLD AUTO: 34.8 %
MCH RBC QN AUTO: 30.7 PG (ref 27–33)
MCHC RBC AUTO-ENTMCNC: 33 G/DL (ref 32–36)
MCV RBC AUTO: 93.1 FL (ref 80–100)
MONOCYTES # BLD AUTO: 721 CELLS/UL (ref 200–950)
MONOCYTES NFR BLD AUTO: 7 %
NEUTROPHILS # BLD AUTO: 5727 CELLS/UL (ref 1500–7800)
NEUTROPHILS NFR BLD AUTO: 55.6 %
NONHDLC SERPL-MCNC: 84 MG/DL (CALC)
PLATELET # BLD AUTO: 230 THOUSAND/UL (ref 140–400)
PMV BLD REES-ECKER: 10.6 FL (ref 7.5–12.5)
POTASSIUM SERPL-SCNC: 4 MMOL/L (ref 3.5–5.3)
PROT SERPL-MCNC: 5.8 G/DL (ref 6.1–8.1)
RBC # BLD AUTO: 4.33 MILLION/UL (ref 3.8–5.1)
SODIUM SERPL-SCNC: 139 MMOL/L (ref 135–146)
TRIGL SERPL-MCNC: 129 MG/DL
TSH SERPL-ACNC: 1.11 MIU/L (ref 0.4–4.5)
WBC # BLD AUTO: 10.3 THOUSAND/UL (ref 3.8–10.8)

## 2025-04-17 ENCOUNTER — TELEPHONE (OUTPATIENT)
Dept: PRIMARY CARE | Facility: CLINIC | Age: 80
End: 2025-04-17
Payer: MEDICARE

## 2025-04-17 DIAGNOSIS — E11.319 DIABETIC RETINOPATHY ASSOCIATED WITH CONTROLLED TYPE 2 DIABETES MELLITUS: ICD-10-CM

## 2025-04-17 RX ORDER — DULAGLUTIDE 0.75 MG/.5ML
0.75 INJECTION, SOLUTION SUBCUTANEOUS
Qty: 6 ML | Refills: 3 | Status: CANCELLED | OUTPATIENT
Start: 2025-04-17 | End: 2025-04-24

## 2025-04-17 NOTE — TELEPHONE ENCOUNTER
PA completed and approved 04/13/2025. Called pharmacy and confirmed medications has previously been prepared and is ready for . Called and spoke to roxana and Rogelio and advised medication is ready for .

## 2025-04-25 DIAGNOSIS — E78.1 HIGH TRIGLYCERIDES: ICD-10-CM

## 2025-04-25 DIAGNOSIS — F32.89 OTHER DEPRESSION: ICD-10-CM

## 2025-04-25 DIAGNOSIS — E03.9 ACQUIRED HYPOTHYROIDISM: ICD-10-CM

## 2025-04-28 RX ORDER — ESCITALOPRAM OXALATE 10 MG/1
10 TABLET ORAL DAILY
Qty: 90 TABLET | Refills: 3 | Status: SHIPPED | OUTPATIENT
Start: 2025-04-28

## 2025-04-28 RX ORDER — SIMVASTATIN 40 MG/1
40 TABLET, FILM COATED ORAL EVERY EVENING
Qty: 90 TABLET | Refills: 3 | Status: SHIPPED | OUTPATIENT
Start: 2025-04-28

## 2025-04-28 RX ORDER — LEVOTHYROXINE SODIUM 50 UG/1
TABLET ORAL
Qty: 90 TABLET | Refills: 3 | Status: SHIPPED | OUTPATIENT
Start: 2025-04-28

## 2025-05-07 ENCOUNTER — TELEPHONE (OUTPATIENT)
Dept: PRIMARY CARE | Facility: CLINIC | Age: 80
End: 2025-05-07
Payer: MEDICARE

## 2025-05-07 DIAGNOSIS — R39.9 UTI SYMPTOMS: Primary | ICD-10-CM

## 2025-05-07 RX ORDER — NITROFURANTOIN 25; 75 MG/1; MG/1
100 CAPSULE ORAL 2 TIMES DAILY
Qty: 10 CAPSULE | Refills: 0 | Status: SHIPPED | OUTPATIENT
Start: 2025-05-07 | End: 2025-05-12

## 2025-05-07 NOTE — TELEPHONE ENCOUNTER
Pt states she feels she has a UTI. Pt has burning with urination, lower abd soreness, and urgency. Pt does not have someone that can take sample.

## 2025-05-13 ENCOUNTER — TELEPHONE (OUTPATIENT)
Dept: PRIMARY CARE | Facility: CLINIC | Age: 80
End: 2025-05-13
Payer: MEDICARE

## 2025-05-13 DIAGNOSIS — N39.0 RECURRENT UTI: Primary | ICD-10-CM

## 2025-05-13 RX ORDER — CEFUROXIME AXETIL 250 MG/1
250 TABLET ORAL 2 TIMES DAILY
Qty: 10 TABLET | Refills: 0 | Status: SHIPPED | OUTPATIENT
Start: 2025-05-13 | End: 2025-05-18

## 2025-05-13 NOTE — TELEPHONE ENCOUNTER
Pt states she finished antibiotic for UTI yesterday. Still having urgency, bladder pressure, and generally feeling unwell.

## 2025-05-27 ENCOUNTER — APPOINTMENT (OUTPATIENT)
Dept: PAIN MEDICINE | Facility: HOSPITAL | Age: 80
End: 2025-05-27
Payer: MEDICARE

## 2025-06-10 ENCOUNTER — APPOINTMENT (OUTPATIENT)
Dept: PAIN MEDICINE | Facility: HOSPITAL | Age: 80
End: 2025-06-10
Payer: MEDICARE

## 2025-07-18 ENCOUNTER — TELEPHONE (OUTPATIENT)
Dept: PRIMARY CARE | Facility: CLINIC | Age: 80
End: 2025-07-18
Payer: MEDICARE

## 2025-07-21 ENCOUNTER — OFFICE VISIT (OUTPATIENT)
Dept: PRIMARY CARE | Facility: CLINIC | Age: 80
End: 2025-07-21
Payer: MEDICARE

## 2025-07-21 VITALS
DIASTOLIC BLOOD PRESSURE: 64 MMHG | HEART RATE: 65 BPM | RESPIRATION RATE: 18 BRPM | SYSTOLIC BLOOD PRESSURE: 114 MMHG | HEIGHT: 62 IN | TEMPERATURE: 97.3 F | OXYGEN SATURATION: 95 % | BODY MASS INDEX: 42.14 KG/M2 | WEIGHT: 229 LBS

## 2025-07-21 DIAGNOSIS — M25.562 CHRONIC PAIN OF BOTH KNEES: ICD-10-CM

## 2025-07-21 DIAGNOSIS — G89.29 CHRONIC PAIN OF BOTH KNEES: ICD-10-CM

## 2025-07-21 DIAGNOSIS — E03.9 ACQUIRED HYPOTHYROIDISM: ICD-10-CM

## 2025-07-21 DIAGNOSIS — E11.42 TYPE 2 DIABETES MELLITUS WITH DIABETIC POLYNEUROPATHY, WITHOUT LONG-TERM CURRENT USE OF INSULIN: Primary | ICD-10-CM

## 2025-07-21 DIAGNOSIS — M25.511 CHRONIC RIGHT SHOULDER PAIN: ICD-10-CM

## 2025-07-21 DIAGNOSIS — I10 ESSENTIAL HYPERTENSION: ICD-10-CM

## 2025-07-21 DIAGNOSIS — M25.561 CHRONIC PAIN OF BOTH KNEES: ICD-10-CM

## 2025-07-21 DIAGNOSIS — E78.1 HIGH TRIGLYCERIDES: ICD-10-CM

## 2025-07-21 DIAGNOSIS — G89.29 CHRONIC RIGHT SHOULDER PAIN: ICD-10-CM

## 2025-07-21 DIAGNOSIS — K21.9 GASTROESOPHAGEAL REFLUX DISEASE WITHOUT ESOPHAGITIS: ICD-10-CM

## 2025-07-21 DIAGNOSIS — R26.81 UNSTEADY GAIT WHEN WALKING: ICD-10-CM

## 2025-07-21 PROCEDURE — 1160F RVW MEDS BY RX/DR IN RCRD: CPT | Performed by: NURSE PRACTITIONER

## 2025-07-21 PROCEDURE — 1126F AMNT PAIN NOTED NONE PRSNT: CPT | Performed by: NURSE PRACTITIONER

## 2025-07-21 PROCEDURE — 99349 HOME/RES VST EST MOD MDM 40: CPT | Performed by: NURSE PRACTITIONER

## 2025-07-21 PROCEDURE — 1159F MED LIST DOCD IN RCRD: CPT | Performed by: NURSE PRACTITIONER

## 2025-07-21 PROCEDURE — 3078F DIAST BP <80 MM HG: CPT | Performed by: NURSE PRACTITIONER

## 2025-07-21 PROCEDURE — 3074F SYST BP LT 130 MM HG: CPT | Performed by: NURSE PRACTITIONER

## 2025-07-21 PROCEDURE — 1036F TOBACCO NON-USER: CPT | Performed by: NURSE PRACTITIONER

## 2025-07-21 ASSESSMENT — PAIN SCALES - GENERAL: PAINLEVEL_OUTOF10: 0-NO PAIN

## 2025-07-21 NOTE — PROGRESS NOTES
Subjective   Patient ID: Sara Zavala is a 80 y.o. female who presents for Follow-up (Routine 3 month follow up, chronic medical conditions ).    Visit for 81 y/o female seen today in private home, alone for 3 month routine follow up of chronic medical conditions.     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     Pt is sitting on lift chair this morning. She is alert, oriented, able to answer all questions regarding her health. Pt lives in a single family home with her brother Larry. She reports that her brother is currently in the hospital so she has been alone for several days. Pt has limited mobility, unsteady gait making it difficult to get out for medical appointments. She does follow with pain management, cardiology, ophthalmology, podiatry, and a dentist but will make most of the appointments in the spring/summer months as she has increased difficulty in the winter, mostly due to unsteady gait and fear of falling. Pt feels it is becoming more challenging to get around. She uses a cane around the house, has a walker outside to help get into the car and has a rollator in the dining room to help get into the kitchen but pt admits that she rarely uses the Rolator as she is worried about falling. Pt denies any recent falls or injuries. She is not interested in home therapy at this time. Pt manages her own medications and is able to do her own personal hygiene, dressing and toileting but requires assistance with meal preparation, housekeeping, grocery shopping and home maintenance. Pt denies appetite changes, signs of weight loss, abdominal pain, nausea, vomiting. She endorses constipation. She will occasionally take Dulcolax tablets and has been taking a probiotic with improvement. Pt denies any urinary concerns.      Chronic pain - pt continues to follow with pain management in [place]. She  endorses pain in her lower back, bilateral knees and right shoulder. Pt denies pain while sitting in the chair but says that it worsens with ambulation.      HTN/AVHB/High Triglycerides - pt continues to follow with cardiologist Dr. Young. She is not monitoring her BP at home, needs to get a new BP cuff. Pt denies headaches, chest pain, palpitations, increased shortness of breath, edema or signs of weight gain. Continues on Fish Oil for elevated Triglycerides. Last lipid panel with improvement.      DM- pt monitors her glucose levels daily. Glucometer reviewed. BG levels range from  with most values between 100-120. Pt had a recent glucose level of 73. She reports feeling very tired when this happened. She denies any other hypoglycemic events. Pt continues to follow with podiatrist Dr. Zee and eye specialist Dr. Morales and Dr. Topete. She continues on Trulicity weekly. Last A1c 6.0.      Home Visit:          Medically necessary due to: Illness or condition that results in activity lmitation or restriction that impacts the ability to leave home such as:, unsteady gait/poor balance         Current Outpatient Medications:     aspirin 81 mg EC tablet, Take 1 tablet (81 mg) by mouth once daily., Disp: , Rfl:     baclofen (Lioresal) 5 mg tablet, Take 1 tablet (5 mg) by mouth 2 times a day as needed for muscle spasms., Disp: 180 tablet, Rfl: 0    blood sugar diagnostic (True Metrix Glucose Test Strip) strip, test twice daily as directed, Disp: 100 each, Rfl: 3    calcium citrate/vitamin D3 (CALCIUM CITRATE + D ORAL), as directed Orally, Disp: , Rfl:     clobetasol (Temovate) 0.05 % ointment, Apply 1 Application topically 2 times a day., Disp: , Rfl:     clotrimazole (Lotrimin) 1 % cream, APPLY 1 APPLICATION EXTERNALLY TWICE A DAY, Disp: 60 g, Rfl: 0    cranberry extract 425 mg capsule, as directed Orally, Disp: , Rfl:     cyanocobalamin (Vitamin B-12) 1,000 mcg tablet, Take 1 tablet (1,000 mcg) by mouth., Disp:  , Rfl:     diclofenac sodium (Voltaren) 1 % gel gel, APPLY SPARINGLY TO THE AFFECTED AREA 2 TO 3 TIMES A DAY AS NEEDED FOR PAIN., Disp: , Rfl:     diphenoxylate-atropine (Lomotil) 2.5-0.025 mg tablet, Take 1 tablet by mouth 4 times a day as needed., Disp: , Rfl:     docusate sodium (Colace) 100 mg capsule, Take 1 capsule (100 mg) by mouth once daily as needed., Disp: , Rfl:     dulaglutide (Trulicity) 0.75 mg/0.5 mL pen injector, INJECT 1 PEN INJECTOR UNDER THE SKIN ONCE EVERY WEEK, Disp: 6 mL, Rfl: 3    escitalopram (Lexapro) 10 mg tablet, TAKE ONE TABLET BY MOUTH DAILY, Disp: 90 tablet, Rfl: 3    gabapentin (Neurontin) 300 mg capsule, TAKE ONE CAPSULE BY MOUTH THREE TIMES A DAY, Disp: 270 capsule, Rfl: 0    indapamide (Lozol) 2.5 mg tablet, TAKE ONE TABLET BY MOUTH DAILY IN THE MORNING, Disp: 90 tablet, Rfl: 3    levothyroxine (Synthroid, Levoxyl) 50 mcg tablet, TAKE ONE TABLET BY MOUTH DAILY IN THE MORNING ON EMPTY STOMACH, Disp: 90 tablet, Rfl: 3    metoprolol tartrate (Lopressor) 25 mg tablet, Take 0.5 tablets (12.5 mg) by mouth 2 times a day., Disp: , Rfl:     multivitamin tablet, Take 1 tablet by mouth once daily., Disp: , Rfl:     omega-3 (Fish Oil) 60- mg capsule, TAKE ONE CAPSULE BY MOUTH TWO TIMES A DAY, Disp: 180 capsule, Rfl: 3    pantoprazole (ProtoNix) 40 mg EC tablet, Take 1 tablet (40 mg) by mouth once daily in the morning. Take before meals. Do not crush, chew, or split., Disp: , Rfl:     saccharomyces boulardii (Florastor) 250 mg capsule, Take 1 capsule (250 mg) by mouth once daily., Disp: , Rfl:     simvastatin (Zocor) 40 mg tablet, TAKE ONE TABLET BY MOUTH DAILY IN THE EVENING, Disp: 90 tablet, Rfl: 3    triamcinolone (Kenalog) 0.1 % ointment, Apply 1 Application topically 2 times a day., Disp: , Rfl:      Review of Systems  Constitutional:  Negative for appetite change, chills, fatigue, fever and unexpected weight change.   HENT: Positive for occasional difficulty swallowing. Negative for  "congestion, hearing loss, nosebleeds, postnasal drip, rhinorrhea, sinus pressure, sinus pain, sneezing, sore throat.   Eyes:  Negative for visual disturbance. Wears glasses.   Respiratory:  Positive for shortness of breath on exertion. Negative for choking, chest tightness, wheezing.    Cardiovascular: Negative for chest pain, palpitations and leg swelling.   Gastrointestinal:  Positive for constipation. Negative for abdominal distention, abdominal pain, blood in stool, rectal pain and vomiting. History of bariatric surgery. Intermittent issue with constipation, takes stool softener, probiotic, fiber, and daily MVI.    Genitourinary:  Negative for difficulty urinating, dysuria, flank pain, frequency, hematuria, pelvic pain and urgency.   Musculoskeletal:  Positive for arthralgias, back pain and gait problem.  Chronic back and bilateral knee pain, at baseline.   Skin:  Negative for rash and wound.   Neurological: Negative for dizziness, seizures, syncope, facial asymmetry, speech difficulty, headaches.   BUE and BLE with neuropathy.   Hematological:  Does not bruise/bleed easily.   Psychiatric/Behavioral:  Negative for agitation, confusion, decreased concentration, dysphoric mood and sleep disturbance. The patient is not nervous/anxious.      Objective   /64 (BP Location: Left arm, Patient Position: Sitting, BP Cuff Size: Adult)   Pulse 65   Temp 36.3 °C (97.3 °F) (Temporal)   Resp 18   Ht 1.575 m (5' 2\")   Wt 104 kg (229 lb)   SpO2 95%   BMI 41.88 kg/m²     Physical Exam  Constitutional:       General: She is not in acute distress.     Appearance: She is obese. She is not toxic-appearing.      Comments: Alert. Well-groomed, well-nourished.   HENT:      Nose: No congestion or rhinorrhea.      Mouth/Throat:      Mouth: Mucous membranes are moist.      Pharynx: Oropharynx is clear. No oropharyngeal exudate or posterior oropharyngeal erythema.   Eyes:      General: No scleral icterus.     Extraocular " Movements: Extraocular movements intact.      Conjunctiva/sclera: Conjunctivae normal.      Pupils: Pupils are equal, round, and reactive to light.   Cardiovascular:      Rate and Rhythm: Normal rate and regular rhythm.      Pulses: Normal pulses.      Heart sounds: Normal heart sounds. No murmur heard  Pulmonary:      Effort: Pulmonary effort is normal. No respiratory distress.      Breath sounds: No wheezing or rales.   Abdominal:      General: Bowel sounds are normal. There is no distension.      Palpations: Abdomen is soft.      Tenderness: There is no abdominal tenderness. There is no guarding or rebound.      Hernia: A hernia is present.   Musculoskeletal:      Cervical back: Neck supple.      Right lower leg: No edema.      Left lower leg: No edema.      Comments: Bilateral knees with decreased ROM.   Skin:     General: Skin is warm and dry.      Capillary Refill: Capillary refill takes less than 2 seconds.      Coloration: Skin is not jaundiced.      Findings: No bruising, lesion or rash.   Neurological:      General: No focal deficit present.      Mental Status: She is alert and oriented to person, place, and time.      Comments: Clear and coherent speech, MAEx4 with equal strength: BUE 5/5, BLE 4/5  Psychiatric:         Mood and Affect: Mood normal.         Behavior: Behavior normal.         Thought Content: Thought content normal.         Judgment: Judgment normal.     Lab Results   Component Value Date    WBC 10.3 04/15/2025    HGB 13.3 04/15/2025    HCT 40.3 04/15/2025    MCV 93.1 04/15/2025     04/15/2025       Chemistry    Lab Results   Component Value Date/Time     04/15/2025 1028    K 4.0 04/15/2025 1028     04/15/2025 1028    CO2 30 04/15/2025 1028    BUN 28 (H) 04/15/2025 1028    CREATININE 0.85 04/15/2025 1028    Lab Results   Component Value Date/Time    CALCIUM 9.0 04/15/2025 1028    ALKPHOS 59 04/15/2025 1028    AST 21 04/15/2025 1028    ALT 16 04/15/2025 1028    BILITOT 0.6  "04/15/2025 1028        Lab Results   Component Value Date    CHOL 122 04/15/2025    CHOL 132 04/22/2024    CHOL 151 10/23/2023     Lab Results   Component Value Date    HDL 38 (L) 04/15/2025    HDL 37.1 04/22/2024    HDL 40.3 10/23/2023     Lab Results   Component Value Date    LDLCALC 63 04/15/2025    LDLCALC 60 04/22/2024    LDLCALC 66 10/23/2023     Lab Results   Component Value Date    TRIG 129 04/15/2025    TRIG 177 (H) 04/22/2024    TRIG 225 (H) 10/23/2023     No components found for: \"CHOLHDL\"     Lab Results   Component Value Date    TSH 1.11 04/15/2025     Lab Results   Component Value Date    HGBA1C 6.0 (H) 04/15/2025      Assessment/Plan   Diagnoses and all orders for this visit:  Type 2 diabetes mellitus with diabetic polyneuropathy, without long-term current use of insulin  -chronic, well controlled, last A1c 6.0  -continue Trulicity 0.75 mg/0.5 ml weekly, Gabapentin 300 mg TID   -monitor BG levels daily   -notify provider with any hypo/hyperglycemic concerns     Essential hypertension  -chronic, vitals stable, follows with cardiology   -continue Aspirin 81 mg daily, Indapamide 2.5 mg daily, Metoprolol 25 mg- 1/2 tablet BID     High triglycerides  -chronic, last lipid panel reviewed with improvement   -continue Simvastatin 40 mg daily, Fish oil capsule BID     Acquired hypothyroidism  -chronic, stable, last TSH stable at 1.11  -continue Levothyroxine 50 mcg daily     Gastroesophageal reflux disease without esophagitis  -chronic, stable, no recent issues with indigestion/abdominal pain  -continue Pantoprazole 40 mg daily     Chronic pain of both knees  Chronic right shoulder pain  -chronic, follows with pain management   -continue Baclofen 5 mg BID, Gabapentin 300 mg TID, Voltaren gel as needed     Unsteady gait when walking  -chronic, recommend using cane/walker when ambulating to minimize fall risk     Patient stable. Will continue house calls NP program due to limited mobility, difficulty getting out " for medical appointments. Advised pt to contact house calls office with any acute concerns or medication needs.          Joi Breen, APRN-CNP

## 2025-08-26 DIAGNOSIS — M47.812 FACET ARTHROPATHY, CERVICAL: ICD-10-CM

## 2025-08-26 DIAGNOSIS — M54.12 CERVICAL RADICULOPATHY: ICD-10-CM

## 2025-08-26 DIAGNOSIS — M19.019 ARTHRITIS, SHOULDER REGION: ICD-10-CM

## 2025-08-26 DIAGNOSIS — G89.29 CHRONIC RIGHT SHOULDER PAIN: ICD-10-CM

## 2025-08-26 DIAGNOSIS — M25.511 CHRONIC RIGHT SHOULDER PAIN: ICD-10-CM

## 2025-08-27 RX ORDER — GABAPENTIN 300 MG/1
300 CAPSULE ORAL 3 TIMES DAILY
Qty: 270 CAPSULE | Refills: 0 | Status: SHIPPED | OUTPATIENT
Start: 2025-08-27